# Patient Record
Sex: FEMALE | Race: BLACK OR AFRICAN AMERICAN | Employment: FULL TIME | URBAN - METROPOLITAN AREA
[De-identification: names, ages, dates, MRNs, and addresses within clinical notes are randomized per-mention and may not be internally consistent; named-entity substitution may affect disease eponyms.]

---

## 2018-09-24 ENCOUNTER — HOSPITAL ENCOUNTER (EMERGENCY)
Age: 28
Discharge: HOME | End: 2018-09-24
Attending: EMERGENCY MEDICINE
Payer: COMMERCIAL

## 2018-09-24 VITALS
SYSTOLIC BLOOD PRESSURE: 113 MMHG | OXYGEN SATURATION: 100 % | RESPIRATION RATE: 20 BRPM | DIASTOLIC BLOOD PRESSURE: 77 MMHG | HEART RATE: 70 BPM | TEMPERATURE: 98.5 F

## 2018-09-24 DIAGNOSIS — J02.9 VIRAL PHARYNGITIS: Primary | ICD-10-CM

## 2018-09-24 LAB — S PYO AG THROAT QL: NEGATIVE

## 2018-09-24 PROCEDURE — 96372 THER/PROPH/DIAG INJ SC/IM: CPT

## 2018-09-24 PROCEDURE — 87880 STREP A ASSAY W/OPTIC: CPT

## 2018-09-24 PROCEDURE — 99283 EMERGENCY DEPT VISIT LOW MDM: CPT

## 2018-09-24 PROCEDURE — 87081 CULTURE SCREEN ONLY: CPT

## 2018-09-24 PROCEDURE — 6360000002 HC RX W HCPCS: Performed by: EMERGENCY MEDICINE

## 2018-09-24 RX ORDER — DEXAMETHASONE SODIUM PHOSPHATE 4 MG/ML
10 INJECTION, SOLUTION INTRA-ARTICULAR; INTRALESIONAL; INTRAMUSCULAR; INTRAVENOUS; SOFT TISSUE ONCE
Status: COMPLETED | OUTPATIENT
Start: 2018-09-24 | End: 2018-09-24

## 2018-09-24 RX ADMIN — DEXAMETHASONE SODIUM PHOSPHATE 10 MG: 4 INJECTION, SOLUTION INTRAMUSCULAR; INTRAVENOUS at 22:54

## 2018-09-24 ASSESSMENT — PAIN SCALES - GENERAL: PAINLEVEL_OUTOF10: 7

## 2018-09-25 NOTE — ED NOTES
Patient reports pharyngitis and cough x1 week. Patient reports pain is now lower to right side of neck. Reports pain with chewing and swallowing.       Raysa Johnson RN  09/24/18 0022

## 2018-09-25 NOTE — ED PROVIDER NOTES
and encouragement to follow up with her primary care physician. Patient was treated with below medications:  Medications   dexamethasone (DECADRON) injection 10 mg (10 mg Intramuscular Given 9/24/18 6552)         Clinical Impression     1.  Viral pharyngitis        Disposition     PATIENT REFERRED TO:  Hari Keller [facility]  [phone]      As needed      DISCHARGE MEDICATIONS:  Discharge Medication List as of 9/24/2018 11:12 PM          DISPOSITION Decision To Discharge 09/24/2018 11:16:30 PM         Marianne Myrick MD  Resident  09/25/18 [id number]

## 2018-09-26 LAB — S PYO THROAT QL CULT: NORMAL

## 2018-12-17 ENCOUNTER — HOSPITAL ENCOUNTER (EMERGENCY)
Age: 28
Discharge: HOME | End: 2018-12-17
Attending: EMERGENCY MEDICINE
Payer: COMMERCIAL

## 2018-12-17 VITALS
TEMPERATURE: 98.9 F | BODY MASS INDEX: 33.71 KG/M2 | OXYGEN SATURATION: 100 % | DIASTOLIC BLOOD PRESSURE: 68 MMHG | WEIGHT: 160.6 LBS | SYSTOLIC BLOOD PRESSURE: 129 MMHG | HEART RATE: 81 BPM | HEIGHT: 58 IN | RESPIRATION RATE: 12 BRPM

## 2018-12-17 DIAGNOSIS — J02.9 SORE THROAT: Primary | ICD-10-CM

## 2018-12-17 LAB — S PYO AG THROAT QL: NEGATIVE

## 2018-12-17 PROCEDURE — 87880 STREP A ASSAY W/OPTIC: CPT

## 2018-12-17 PROCEDURE — 87081 CULTURE SCREEN ONLY: CPT

## 2018-12-17 PROCEDURE — 99282 EMERGENCY DEPT VISIT SF MDM: CPT

## 2018-12-17 ASSESSMENT — PAIN SCALES - GENERAL: PAINLEVEL_OUTOF10: 8

## 2018-12-20 LAB — S PYO THROAT QL CULT: NORMAL

## 2019-03-08 ENCOUNTER — HOSPITAL ENCOUNTER (EMERGENCY)
Age: 29
Discharge: HOME | End: 2019-03-08
Attending: EMERGENCY MEDICINE
Payer: COMMERCIAL

## 2019-03-08 VITALS
BODY MASS INDEX: 32.68 KG/M2 | HEART RATE: 101 BPM | OXYGEN SATURATION: 99 % | DIASTOLIC BLOOD PRESSURE: 75 MMHG | WEIGHT: 156.38 LBS | SYSTOLIC BLOOD PRESSURE: 147 MMHG | TEMPERATURE: 98.4 F | RESPIRATION RATE: 14 BRPM

## 2019-03-08 DIAGNOSIS — H10.9 CONJUNCTIVITIS OF LEFT EYE, UNSPECIFIED CONJUNCTIVITIS TYPE: ICD-10-CM

## 2019-03-08 DIAGNOSIS — J02.9 VIRAL PHARYNGITIS: Primary | ICD-10-CM

## 2019-03-08 DIAGNOSIS — R03.0 ELEVATED BLOOD PRESSURE READING: ICD-10-CM

## 2019-03-08 LAB — S PYO AG THROAT QL: NEGATIVE

## 2019-03-08 PROCEDURE — 87880 STREP A ASSAY W/OPTIC: CPT

## 2019-03-08 PROCEDURE — 87081 CULTURE SCREEN ONLY: CPT

## 2019-03-08 PROCEDURE — 99283 EMERGENCY DEPT VISIT LOW MDM: CPT

## 2019-03-08 RX ORDER — POLYMYXIN B SULFATE AND TRIMETHOPRIM 1; 10000 MG/ML; [USP'U]/ML
1 SOLUTION OPHTHALMIC
Qty: 1 BOTTLE | Refills: 0 | Status: SHIPPED | OUTPATIENT
Start: 2019-03-08 | End: 2019-03-15

## 2019-03-08 RX ORDER — IBUPROFEN 600 MG/1
600 TABLET ORAL EVERY 8 HOURS PRN
Qty: 20 TABLET | Refills: 1 | Status: SHIPPED | OUTPATIENT
Start: 2019-03-08 | End: 2019-11-17

## 2019-03-08 ASSESSMENT — PAIN DESCRIPTION - DESCRIPTORS: DESCRIPTORS: ACHING

## 2019-03-08 ASSESSMENT — PAIN SCALES - GENERAL: PAINLEVEL_OUTOF10: 7

## 2019-03-08 ASSESSMENT — PAIN DESCRIPTION - PAIN TYPE: TYPE: ACUTE PAIN

## 2019-03-08 ASSESSMENT — PAIN DESCRIPTION - LOCATION: LOCATION: THROAT

## 2019-03-11 LAB — S PYO THROAT QL CULT: NORMAL

## 2019-08-20 ENCOUNTER — HOSPITAL ENCOUNTER (EMERGENCY)
Age: 29
Discharge: HOME | End: 2019-08-20
Attending: EMERGENCY MEDICINE
Payer: COMMERCIAL

## 2019-08-20 ENCOUNTER — APPOINTMENT (OUTPATIENT)
Dept: GENERAL RADIOLOGY | Age: 29
End: 2019-08-20
Payer: COMMERCIAL

## 2019-08-20 VITALS
DIASTOLIC BLOOD PRESSURE: 47 MMHG | RESPIRATION RATE: 15 BRPM | HEART RATE: 89 BPM | HEIGHT: 58 IN | WEIGHT: 158.4 LBS | TEMPERATURE: 98.4 F | OXYGEN SATURATION: 100 % | BODY MASS INDEX: 33.25 KG/M2 | SYSTOLIC BLOOD PRESSURE: 117 MMHG

## 2019-08-20 DIAGNOSIS — M79.602 LEFT ARM PAIN: Primary | ICD-10-CM

## 2019-08-20 PROCEDURE — 99283 EMERGENCY DEPT VISIT LOW MDM: CPT

## 2019-08-20 PROCEDURE — L3931 WHFO NONTORSION JOINT PREFAB: HCPCS

## 2019-08-20 PROCEDURE — 6370000000 HC RX 637 (ALT 250 FOR IP): Performed by: EMERGENCY MEDICINE

## 2019-08-20 PROCEDURE — 73090 X-RAY EXAM OF FOREARM: CPT

## 2019-08-20 RX ORDER — NAPROXEN 500 MG/1
500 TABLET ORAL 2 TIMES DAILY WITH MEALS
Qty: 30 TABLET | Refills: 0 | Status: SHIPPED | OUTPATIENT
Start: 2019-08-20 | End: 2019-11-17

## 2019-08-20 RX ADMIN — IBUPROFEN 600 MG: 200 TABLET, FILM COATED ORAL at 09:17

## 2019-08-20 ASSESSMENT — PAIN DESCRIPTION - ORIENTATION: ORIENTATION: LEFT

## 2019-08-20 ASSESSMENT — PAIN SCALES - GENERAL
PAINLEVEL_OUTOF10: 5

## 2019-08-20 ASSESSMENT — PAIN DESCRIPTION - PAIN TYPE: TYPE: ACUTE PAIN

## 2019-08-20 ASSESSMENT — PAIN DESCRIPTION - LOCATION: LOCATION: ARM

## 2019-08-20 ASSESSMENT — PAIN DESCRIPTION - DESCRIPTORS: DESCRIPTORS: SHARP

## 2019-08-25 ENCOUNTER — APPOINTMENT (OUTPATIENT)
Dept: GENERAL RADIOLOGY | Age: 29
End: 2019-08-25
Payer: COMMERCIAL

## 2019-08-25 ENCOUNTER — HOSPITAL ENCOUNTER (EMERGENCY)
Age: 29
Discharge: HOME | End: 2019-08-25
Attending: EMERGENCY MEDICINE
Payer: COMMERCIAL

## 2019-08-25 VITALS
SYSTOLIC BLOOD PRESSURE: 130 MMHG | BODY MASS INDEX: 33.17 KG/M2 | HEIGHT: 58 IN | OXYGEN SATURATION: 100 % | TEMPERATURE: 99.1 F | HEART RATE: 91 BPM | RESPIRATION RATE: 18 BRPM | WEIGHT: 158 LBS | DIASTOLIC BLOOD PRESSURE: 84 MMHG

## 2019-08-25 DIAGNOSIS — K21.00 GASTROESOPHAGEAL REFLUX DISEASE WITH ESOPHAGITIS: Primary | ICD-10-CM

## 2019-08-25 PROCEDURE — 93005 ELECTROCARDIOGRAM TRACING: CPT | Performed by: EMERGENCY MEDICINE

## 2019-08-25 PROCEDURE — 71046 X-RAY EXAM CHEST 2 VIEWS: CPT

## 2019-08-25 PROCEDURE — 6370000000 HC RX 637 (ALT 250 FOR IP): Performed by: PHYSICIAN ASSISTANT

## 2019-08-25 PROCEDURE — 99283 EMERGENCY DEPT VISIT LOW MDM: CPT

## 2019-08-25 RX ORDER — FAMOTIDINE 20 MG/1
20 TABLET, FILM COATED ORAL 2 TIMES DAILY
Qty: 14 TABLET | Refills: 0 | Status: SHIPPED | OUTPATIENT
Start: 2019-08-25 | End: 2020-11-21

## 2019-08-25 RX ORDER — PANTOPRAZOLE SODIUM 20 MG/1
40 TABLET, DELAYED RELEASE ORAL DAILY
Qty: 30 TABLET | Refills: 0 | Status: SHIPPED | OUTPATIENT
Start: 2019-08-25

## 2019-08-25 RX ADMIN — LIDOCAINE HYDROCHLORIDE: 20 SOLUTION ORAL; TOPICAL at 20:24

## 2019-08-25 ASSESSMENT — ENCOUNTER SYMPTOMS
NAUSEA: 0
VOMITING: 0
ABDOMINAL PAIN: 0
SORE THROAT: 1
SHORTNESS OF BREATH: 0

## 2019-08-25 ASSESSMENT — PAIN SCALES - GENERAL: PAINLEVEL_OUTOF10: 8

## 2019-08-25 ASSESSMENT — PAIN DESCRIPTION - LOCATION: LOCATION: THROAT

## 2019-08-26 LAB
EKG ATRIAL RATE: 89 BPM
EKG DIAGNOSIS: NORMAL
EKG P AXIS: 47 DEGREES
EKG P-R INTERVAL: 146 MS
EKG Q-T INTERVAL: 354 MS
EKG QRS DURATION: 74 MS
EKG QTC CALCULATION (BAZETT): 430 MS
EKG R AXIS: 69 DEGREES
EKG T AXIS: 37 DEGREES
EKG VENTRICULAR RATE: 89 BPM

## 2019-08-26 NOTE — ED PROVIDER NOTES
ED Attending Attestation Note     Date of evaluation: 8/25/2019    This patient was seen by the advance practice provider. I have seen and examined the patient, agree with the workup, evaluation, management and diagnosis. The care plan has been discussed. My assessment reveals patient with esophageal burning that was improved with a GI cocktail. She is otherwise young and healthy. Times occurred after eating chicken fingers and fries and seem to be worsened with food intake. KG and chest x-ray are unremarkable. I suspect she has esophagitis and will be treated with a PPI given almost complete resolution of her symptoms after the GI cocktail.      Marysol Lorenzo MD  08/26/19 4613

## 2019-11-17 ENCOUNTER — HOSPITAL ENCOUNTER (EMERGENCY)
Age: 29
Discharge: HOME | End: 2019-11-17
Attending: EMERGENCY MEDICINE
Payer: COMMERCIAL

## 2019-11-17 VITALS
TEMPERATURE: 98.7 F | SYSTOLIC BLOOD PRESSURE: 140 MMHG | HEART RATE: 95 BPM | BODY MASS INDEX: 33.86 KG/M2 | HEIGHT: 58 IN | WEIGHT: 161.3 LBS | OXYGEN SATURATION: 100 % | RESPIRATION RATE: 18 BRPM | DIASTOLIC BLOOD PRESSURE: 74 MMHG

## 2019-11-17 DIAGNOSIS — J02.9 ACUTE PHARYNGITIS, UNSPECIFIED ETIOLOGY: Primary | ICD-10-CM

## 2019-11-17 DIAGNOSIS — H10.9 CONJUNCTIVITIS OF RIGHT EYE, UNSPECIFIED CONJUNCTIVITIS TYPE: ICD-10-CM

## 2019-11-17 PROCEDURE — 6370000000 HC RX 637 (ALT 250 FOR IP): Performed by: EMERGENCY MEDICINE

## 2019-11-17 PROCEDURE — 99282 EMERGENCY DEPT VISIT SF MDM: CPT

## 2019-11-17 PROCEDURE — 6360000002 HC RX W HCPCS: Performed by: EMERGENCY MEDICINE

## 2019-11-17 RX ORDER — NAPROXEN 500 MG/1
500 TABLET ORAL ONCE
Status: COMPLETED | OUTPATIENT
Start: 2019-11-17 | End: 2019-11-17

## 2019-11-17 RX ORDER — NAPROXEN 500 MG/1
500 TABLET ORAL 2 TIMES DAILY PRN
Qty: 60 TABLET | Refills: 0 | Status: SHIPPED | OUTPATIENT
Start: 2019-11-17 | End: 2020-01-07

## 2019-11-17 RX ORDER — ERYTHROMYCIN 5 MG/G
OINTMENT OPHTHALMIC
Qty: 1 TUBE | Refills: 0 | Status: SHIPPED | OUTPATIENT
Start: 2019-11-17 | End: 2019-11-27

## 2019-11-17 RX ORDER — DEXAMETHASONE 4 MG/1
8 TABLET ORAL ONCE
Status: COMPLETED | OUTPATIENT
Start: 2019-11-17 | End: 2019-11-17

## 2019-11-17 RX ADMIN — NAPROXEN 500 MG: 500 TABLET ORAL at 20:37

## 2019-11-17 RX ADMIN — DEXAMETHASONE 4 MG: 4 TABLET ORAL at 20:36

## 2019-11-17 ASSESSMENT — PAIN SCALES - GENERAL
PAINLEVEL_OUTOF10: 9
PAINLEVEL_OUTOF10: 7

## 2019-11-17 ASSESSMENT — PAIN DESCRIPTION - FREQUENCY: FREQUENCY: INTERMITTENT

## 2019-11-17 ASSESSMENT — PAIN DESCRIPTION - LOCATION: LOCATION: THROAT

## 2019-11-17 ASSESSMENT — PAIN DESCRIPTION - DESCRIPTORS: DESCRIPTORS: ACHING

## 2019-11-17 ASSESSMENT — PAIN DESCRIPTION - PAIN TYPE: TYPE: ACUTE PAIN

## 2020-01-07 ENCOUNTER — HOSPITAL ENCOUNTER (EMERGENCY)
Age: 30
Discharge: HOME | End: 2020-01-07
Attending: EMERGENCY MEDICINE
Payer: COMMERCIAL

## 2020-01-07 VITALS
WEIGHT: 158.7 LBS | TEMPERATURE: 100.9 F | HEART RATE: 105 BPM | SYSTOLIC BLOOD PRESSURE: 121 MMHG | DIASTOLIC BLOOD PRESSURE: 63 MMHG | RESPIRATION RATE: 16 BRPM | BODY MASS INDEX: 33.17 KG/M2 | OXYGEN SATURATION: 99 %

## 2020-01-07 LAB
RAPID INFLUENZA  B AGN: POSITIVE
RAPID INFLUENZA A AGN: NEGATIVE

## 2020-01-07 PROCEDURE — 99283 EMERGENCY DEPT VISIT LOW MDM: CPT

## 2020-01-07 PROCEDURE — 87804 INFLUENZA ASSAY W/OPTIC: CPT

## 2020-01-07 RX ORDER — OSELTAMIVIR PHOSPHATE 75 MG/1
75 CAPSULE ORAL 2 TIMES DAILY
Qty: 10 CAPSULE | Refills: 0 | Status: SHIPPED | OUTPATIENT
Start: 2020-01-07 | End: 2020-01-12

## 2020-01-07 RX ORDER — IBUPROFEN 600 MG/1
600 TABLET ORAL EVERY 8 HOURS PRN
Qty: 20 TABLET | Refills: 1 | Status: SHIPPED | OUTPATIENT
Start: 2020-01-07 | End: 2020-11-21

## 2020-01-07 RX ORDER — DEXTROMETHORPHAN POLISTIREX 30 MG/5ML
60 SUSPENSION ORAL 2 TIMES DAILY PRN
Qty: 89 ML | Refills: 1 | Status: SHIPPED | OUTPATIENT
Start: 2020-01-07 | End: 2020-01-14

## 2020-01-07 ASSESSMENT — PAIN DESCRIPTION - PAIN TYPE: TYPE: ACUTE PAIN

## 2020-01-07 ASSESSMENT — PAIN DESCRIPTION - LOCATION: LOCATION: CHEST

## 2020-01-07 ASSESSMENT — PAIN SCALES - GENERAL: PAINLEVEL_OUTOF10: 8

## 2020-01-07 NOTE — ED PROVIDER NOTES
TRIAGE CHIEF COMPLAINT:   Chief Complaint   Patient presents with    Chest Pain         HPI: Elizabeth Muniz is a 34 y.o. female who presents to the Emergency Department with complaint of 3-day history of myalgias, dry cough and fever. She states she has some bilateral chest discomfort only when she coughs. Denies shortness of breath. No chest pain with walking. Denies sore throat. She has some mild bilateral ear pain. Denies abdominal pain vomiting or bowel complaint. No UTI symptoms. 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 REVIEW OF SYSTEMS:  6 systems reviewed. Pertinent positives per HPI. Otherwise noted to be negative. Nursing notes reviewed and agree with above. Past medical/surgical history reviewed. MEDICATIONS   Patient's Medications   New Prescriptions    No medications on file   Previous Medications    FAMOTIDINE (PEPCID) 20 MG TABLET    Take 1 tablet by mouth 2 times daily    NAPROXEN (NAPROSYN) 500 MG TABLET    Take 1 tablet by mouth 2 times daily as needed for Pain    NONFORMULARY    bcp    PANTOPRAZOLE (PROTONIX) 20 MG TABLET    Take 2 tablets by mouth daily   Modified Medications    No medications on file   Discontinued Medications    No medications on file         ALLERGIES No Known Allergies      /63   Pulse 105   Temp 100.9 °F (38.3 °C) (Oral)   Resp 16   Wt 72 kg (158 lb 11.2 oz)   LMP 12/31/2019   SpO2 99%   BMI 33.17 kg/m²   General:  No acute distress. Non toxic appearance  Head:   Normocephalic and atraumatic  Eyes:   Conjunctiva clear, ANDREW, EOM's intact. Sclera anicteric. ENT:   Mucous membranes moist.  TMs are both normal.  Nose is clear. No sinus tenderness. Oropharynx shows no redness, swelling or exudate. Neck:   Supple. No adenopathy or jugular venous distension  Lungs/Chest:  No respiratory distress. Lungs are clear bilaterally. She is not coughing here.   CVS:   Regular rate and

## 2020-11-21 ENCOUNTER — HOSPITAL ENCOUNTER (EMERGENCY)
Age: 30
Discharge: HOME | End: 2020-11-21
Attending: EMERGENCY MEDICINE
Payer: COMMERCIAL

## 2020-11-21 ENCOUNTER — APPOINTMENT (OUTPATIENT)
Dept: GENERAL RADIOLOGY | Age: 30
End: 2020-11-21
Payer: COMMERCIAL

## 2020-11-21 VITALS
HEIGHT: 59 IN | RESPIRATION RATE: 20 BRPM | WEIGHT: 164.9 LBS | TEMPERATURE: 98.6 F | BODY MASS INDEX: 33.24 KG/M2 | DIASTOLIC BLOOD PRESSURE: 55 MMHG | HEART RATE: 82 BPM | SYSTOLIC BLOOD PRESSURE: 127 MMHG | OXYGEN SATURATION: 100 %

## 2020-11-21 PROCEDURE — 73140 X-RAY EXAM OF FINGER(S): CPT

## 2020-11-21 PROCEDURE — 99283 EMERGENCY DEPT VISIT LOW MDM: CPT

## 2020-11-21 PROCEDURE — 6370000000 HC RX 637 (ALT 250 FOR IP): Performed by: EMERGENCY MEDICINE

## 2020-11-21 RX ORDER — ACETAMINOPHEN 500 MG
1000 TABLET ORAL ONCE
Status: COMPLETED | OUTPATIENT
Start: 2020-11-21 | End: 2020-11-21

## 2020-11-21 RX ADMIN — ACETAMINOPHEN 1000 MG: 500 TABLET ORAL at 08:46

## 2020-11-21 ASSESSMENT — PAIN DESCRIPTION - LOCATION: LOCATION: HAND

## 2020-11-21 ASSESSMENT — PAIN DESCRIPTION - DESCRIPTORS: DESCRIPTORS: NUMBNESS

## 2020-11-21 ASSESSMENT — PAIN DESCRIPTION - ORIENTATION: ORIENTATION: LEFT

## 2020-11-21 ASSESSMENT — PAIN DESCRIPTION - PAIN TYPE: TYPE: ACUTE PAIN

## 2020-11-21 ASSESSMENT — PAIN SCALES - GENERAL: PAINLEVEL_OUTOF10: 0

## 2020-11-21 NOTE — ED PROVIDER NOTES
CHIEF COMPLAINT  Hand Pain (LMF pain after hitting a bed with finger yesterday, per pt. + swelling, - lac, decr ROM. No other known injuries.)      HISTORY OF PRESENT ILLNESS  Madhu Chavez is a 27 y.o. female, who presents to the ED with onset yesterday of left middle finger pain after she accidentally hit her left middle finger on a headboard of a bed at her place of work. Patient has mild pain with rest severe pain with movement of the finger. She states that she feels a \"numbness\" when she does not move her finger. She denies other injuries. She has not taken oral analgesics for the pain. No other complaints,modifying factors or associated symptoms. Review of Systems    I have reviewed the following from the nursing documentation. Past Medical History:   Diagnosis Date    Influenza B 2020    Neisseria gonorrheae 2018     Past Surgical History:   Procedure Laterality Date     SECTION       SECTION       History reviewed. No pertinent family history.   Social History     Socioeconomic History    Marital status: Single     Spouse name: Not on file    Number of children: Not on file    Years of education: Not on file    Highest education level: Not on file   Occupational History    Not on file   Social Needs    Financial resource strain: Not on file    Food insecurity     Worry: Not on file     Inability: Not on file    Transportation needs     Medical: Not on file     Non-medical: Not on file   Tobacco Use    Smoking status: Never Smoker    Smokeless tobacco: Never Used   Substance and Sexual Activity    Alcohol use: No    Drug use: No    Sexual activity: Yes     Partners: Male   Lifestyle    Physical activity     Days per week: Not on file     Minutes per session: Not on file    Stress: Not on file   Relationships    Social connections     Talks on phone: Not on file     Gets together: Not on file     Attends [employer] service: Not on file     Active member middle phalanx. PROCEDURES  Procedures    ED COURSE/MDM  Patient seen and evaluated. Old records reviewed if pertinent. Labs and imaging reviewed and results discussed withpatient. I considered finger fracture, dislocation, soft tissue injury including strain, sprain, contusion. Plan of care discussed with patient or family as appropriate. Patient or family in agreement with plan. The patient was advised to keep the splint on until seen by Dr. Senait Schneider next week. She was also advised that Dr. Senait Schneider would evaluate for the need for further treatment including possibility of wire placement for fixation. If discharged, patient was given scripts for the following medications. New Prescriptions    No medications on file       CLINICAL IMPRESSION  1. Closed nondisplaced fracture of middle phalanx of left middle finger, initial encounter        Blood pressure (!) 127/55, pulse 82, temperature 98.6 °F (37 °C), temperature source Oral, resp. rate 20, height 4' 11\" (1.499 m), weight 164 lb 14.4 oz (74.8 kg), last menstrual period 12/31/2019, SpO2 100 %, not currently breastfeeding. DISPOSITION  Waylon Ramirez was discharged in stable condition.                      Eboni Perez MD  11/21/20 5714

## 2020-11-30 ENCOUNTER — OFFICE VISIT (OUTPATIENT)
Dept: ORTHOPEDIC SURGERY | Age: 30
End: 2020-11-30
Payer: COMMERCIAL

## 2020-11-30 VITALS — HEIGHT: 59 IN | BODY MASS INDEX: 33.06 KG/M2 | WEIGHT: 164 LBS

## 2020-11-30 PROCEDURE — 99203 OFFICE O/P NEW LOW 30 MIN: CPT | Performed by: ORTHOPAEDIC SURGERY

## 2020-11-30 PROCEDURE — G8419 CALC BMI OUT NRM PARAM NOF/U: HCPCS | Performed by: ORTHOPAEDIC SURGERY

## 2020-11-30 PROCEDURE — G8484 FLU IMMUNIZE NO ADMIN: HCPCS | Performed by: ORTHOPAEDIC SURGERY

## 2020-11-30 PROCEDURE — G8427 DOCREV CUR MEDS BY ELIG CLIN: HCPCS | Performed by: ORTHOPAEDIC SURGERY

## 2020-11-30 PROCEDURE — 1036F TOBACCO NON-USER: CPT | Performed by: ORTHOPAEDIC SURGERY

## 2020-11-30 NOTE — PROGRESS NOTES
Chief Complaint  Finger Pain (Left long finger)      History of Present Illness:  Dipak Yu is a 27 y.o. 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  Date of injury: 11/20/2020  Mechanism of injury: The patient was in a residence room when she struck her left hand off of the top of the bed particularly her left long finger  She presented several days later to the emergency department where images obtained demonstrated fracture at the base of middle phalanx volar aspect nondisplaced involving approximately 25% of joint surface  The patient was placed in a splint at the time of her injury  She reports that she is continue to have some discomfort when her finger is bumped and she has been in a finger splint since that time reporting some tingling sensation as well  No antecedent injury or associated injuries reported by the patient. The patient does report that she is 2 months pregnant currently    Medical History  Patient's medications, allergies, past medical, surgical, social and family histories were reviewed and updated as appropriate. Past Medical History:   Diagnosis Date    Influenza B 01/07/2020    Neisseria gonorrheae 06/28/2018       Review of Systems  Pertinent items are noted in HPI  Review of systems reviewed from Patient History Form dated on 11/30/2020 and available in the patient's chart under the Media tab. Vital Signs  There were no vitals filed for this visit. General Exam:   Constitutional: Patient is adequately groomed with no evidence of malnutrition  Mental Status: The patient is oriented to time, place and person. The patient's mood and affect are appropriate. Lymphatic: The lymphatic examination bilaterally reveals all areas to be without enlargement or induration. Neurological: The patient has good coordination. There is no weakness or sensory deficit.     Left long finger/hand examination  Inspection: Mild swelling of the left long finger  No evidence of clinical malrotation or angulation  No open wounds or skin breaks  No evidence of boutonniere or swan-neck deformity    Palpation: Mild tenderness to palpation near the PIP joint specifically volar aspect  No tenderness throughout the remainder of the finger or hand  Grossly stable finger with gentle stress radial and ulnar at PIP joint at full extension and 30 degrees of flexion  Range of Motion: Limited range of motion particularly with PIP joint flexion today approximately 30 degrees actively, full active extension of PIP joint is noted    Strength: 5 out of 5 FDS FDP EDC interossei    Special Tests: Negative Uriel's test left long finger  Gross sensation throughout fingertip radial and ulnar aspect of the fingertip with brisk capillary refill    Skin: There are no additional worrisome rashes, ulcerations or lesions. Gait: normal nonantalgic gait    Circulation: well perfused with brisk capillary refill        Additional Comments:     Additional Examinations:  Right Upper Extremity:  Examination of the right upper extremity does not show any tenderness, deformity or injury. Range of motion is unremarkable. There is no gross instability. There are no rashes, ulcerations or lesions. Strength and tone are normal.      Radiology:     X-rays obtained and reviewed in office:  Views 1  Location left long finger  Impression 1 view obtained secondary to patient pregnancy, lateral view demonstrating nondisplaced fracture at the base of the middle phalanx what appears to be involving 20 to 25% of the joint with no evidence of intra-articular step-off or gap, negative V sign with no joint subluxation      Assessment: 26-year-old female presenting with history of left long finger injury with direct blow to the left long finger sustained 11/20/2020  1.   Nondisplaced left long finger middle phalanx base intra-articular fracture with no evidence of joint subluxation or dislocation    Impression:   Encounter Diagnosis   Name Primary?  Closed nondisplaced fracture of middle phalanx of left middle finger, initial encounter Yes       Office Procedures:  Orders Placed This Encounter   Procedures    XR FINGER LEFT (MIN 2 VIEWS)     Standing Status:   Future     Number of Occurrences:   1     Standing Expiration Date:   11/30/2021       Treatment Plan: Discussed today the patient's injury and the risks and benefits of obtaining additional imaging particularly to evaluate for presence of possible subluxation or instability at the joint. The patient was understanding due to her recent pregnancy and we did just obtain 1 lateral image to evaluate today  The joint demonstrates continued stable joint surface with no intra-articular step-off or gap and no evidence of joint subluxation. Based on appearance of the joint I do think that we can continue with conservative management for now. We will provide the patient with staci straps to be worn at all times except for hand hygiene. Appropriate skin care discussed with use of staci straps. I think it is appropriate for the patient to begin some gentle active range of motion for her finger as she does demonstrate stiffness now 10 days status post injury. We will refer her to hand therapy to start that likely next week. I would like her to avoid any heavy lifting or gripping with that hand with lifting gripping restrictions 5 pounds only using her fingers for some very light activities.   We will continue to closely monitor and see her back in the next 2 weeks for repeat evaluation clinically and discuss the possibility of repeat imaging if necessary

## 2020-12-01 ENCOUNTER — HOSPITAL ENCOUNTER (OUTPATIENT)
Dept: OCCUPATIONAL THERAPY | Age: 30
Setting detail: THERAPIES SERIES
Discharge: HOME | End: 2020-12-01
Payer: COMMERCIAL

## 2020-12-01 PROCEDURE — 97165 OT EVAL LOW COMPLEX 30 MIN: CPT | Performed by: OCCUPATIONAL THERAPIST

## 2020-12-01 PROCEDURE — 97110 THERAPEUTIC EXERCISES: CPT | Performed by: OCCUPATIONAL THERAPIST

## 2020-12-01 NOTE — PLAN OF CARE
[facility]  [address], [address] [address], [address] [facility]  Phone: [phone] Fax: [phone]            Occupational Therapy/Hand Therapy Certification  Dear Referring Practitioner: Dr. Jd King,     We had the pleasure of evaluating the following patient for occupational therapy services at [address] [place] [address]. A summary of our findings can be found in the initial assessment below. This includes our plan of care. If you have any questions or concerns regarding these findings, please do not hesitate to contact me at the office phone number checked above. Thank you for the referral.     Physician Signature:_______________________________Date:__________________  By signing above (or electronic signature), therapists plan is approved by physician      Patient: Humera Alvarado   : 1990   MRN: [id number]  Referring Physician: Referring Practitioner: Dr. Jd King      Evaluation Date: 2020      Medical Diagnosis Information:  Diagnosis: L77.140Q (ICD-10-CM) - Closed nondisplaced fracture of middle phalanx of left middle finger, initial encounter    Treatment Diagnosis: M25.642              Insurance information: OT Insurance Information: [employer]      Date of Injury: 20  Date of Surgery: N/A    Date of Patient follow up with Physician: 20    RESTRICTIONS/PRECAUTIONS: 5# lifting restriction at work    Latex Allergy:  []No      []Yes  Pacemaker:  [] No       [] Yes     Preferred Language for Healthcare:   [x]English       []other:     Functional Scale: 25% (Quick DASH)   Date assessed:  2020    C-SSRS Triggered by Intake questionnaire (Past 2 wk assessment):    No, Questionnaire did not trigger screening.     Yes, Patient intake triggered further evaluation       C-SSRS Screening completed   PCP notified via Plan of Care    Emergency services notified    SUBJECTIVE: Patient reported deficits/history of current problem: referred for therapy due to ongoing stiffness following fracture at work 2 weeks ago. Pain Scale: 7/10  [x]Constant      []Intermittent    []other:  Pain Location:  Middle finger  Easing factors: none  Provocative factors: no worsening     [x] Patient reported history, allergies, and medications reviewed - see intake form. Occupational Profile:  Home Enviroment: lives with  [] spouse,  [] family,  [] alone,  [] significant other,   [] other:    Occupation/School: [employer].  Duties are very limited due to restrictions    Recreational Activities/Meaningful Interests: job is very physical, walking    Prior Level of Function: [x] Independent with ADLs/IADLs     [] Assistance needed (describe):    Patient-Identified Primary Performance Deficits (to be addressed in POC):   [] bathing    [] household tasks    [] dressing    [] self feeding   [] grooming    [] work/education   [] functional mobility   [] sleeping/rest   [] toileting/hygiene   [] recreational activities   [] driving    [] community/social participation   [] other:     Comorbidities Affecting Functional Performance:     []Anxiety (F41.9)/Depression (F32.9)   []Diabetes Type 1(E10.65) or 2 (E11.65)   []Rheumatoid Arthritis (M05.9)  []Fibromyalgia (M79.7)  []Neuropathy(G60.9)  []Osteoarthritis(M19.91)  []None   []Other:    Hand Dominance:    [x]  Right    [] Left      OBJECTIVE:        AROM: Right Left   IF MP  PIP  DIP       LF MP  PIP  DIP    0/80  0/25  0/0   RF MP  PIP   DIP       SF MP  PIP  DIP       Digits: tips to DPFC           Thumb MP  IP     Thumb tip to DPFC     Wrist Ext/Flex            RD/UD     Forearm sup/pron       Elbow ext/flex       Shoulder Flex                   Abd                   IR/ER          Edema:     Middle PIPJ 5.9cm 6.2cm        Strength:  NT    II     Lateral Pinch     3 Point Pinch     Tip Pinch     MMT:            Observations (including splints, bandages, incisions, scars): N/A    Sensation:  [x] No reported deficits  [] Intact to light touch    [] [place] test completed, findings as noted:  [] Other:    Palpation: N/A    Functional Mobility/Transfers/Gait:  [x] Independent - no significant gait deviations  [] Assistance needed   [] Assistive device used: Falls Risk Assessment (30 days):   [x] Falls Risk assessed and no intervention required. [] Falls Risk assessed and Patient requires intervention due to being higher risk   TUG score (>12s at risk):     [] Falls education provided, including      Review Of Systems (ROS): [x]Performed Review of systems (Integumentary, CardioPulmonary, Neurological) by intake and observation. Intake form has been scanned into medical record. Patient has been instructed to contact their primary care physician regarding ROS issues if not already being addressed at this time. ASSESSMENT:   This patient presents with signs and symptoms consistent with the medical diagnosis provided by the referring physician. Impairments (physical, cognitive and/or psychosocial):  [x] Decreased mobility  [] Weakness    [] Hypersensitivity   [x] Pain/tenderness   [x] Edema/swelling   [] Decreased coordination (fine/gross motor)   [] Impaired body mechanics  [] Sensory loss  [] Loss of balance   [] Other:      Performance Deficits (to be addressed in plan of care):   [] Bathing    [x] Household Tasks    [] Dressing    [] Self Feeding   [] Grooming    [x] Work/Education   [x] Functional Mobility  [] Sleeping/Rest   [] Toileting/Hygiene   [] Recreational Activities   [] Driving    [] Community/Social Participation   [] Other:     Rehab Potential:   [] Excellent [x] Good [] Fair  [] Poor     Barriers affecting rehab potential:  []Age    []Lack of Motivation   []Co-Morbidities  []Cognitive Function  []Environmental/home/work barriers  []Other:     Tolerance of evaluation/treatment:    [] Excellent [x] Good [] Fair  [] Poor    PLAN OF CARE:  Interventions: [x] Therapeutic Exercise [x] Therapeutic Activity    [x] Activities of Daily Living [x] Neuromuscular Re-education      [x] Patient Education  [x] Manual Therapy      [x] Modalities as needed, and not otherwise contraindicated, including: ultrasound,paraffin,moist heat/cold pack, electrical stimulation, contrast bath, iontophoresis  [] Buddy straps    Frequency/Duration:  1-2 days per week for 4-6 weeks      GOALS:  Patient stated goal: moving my fingers better    [] Progressing: [] Met: [] Not Met: [] Adjusted    Therapist goals for Patient:   Short Term Goals: To be achieved in: 2 weeks  1. Independent in HEP and progression per patient tolerance, in order to prevent re-injury. [] Progressing: [] Met: [] Not Met: [] Adjusted   2. Patient will have a decrease in pain to facilitate improvement in movement, function, and ADLs as indicated by Functional Deficits. [] Progressing: [] Met: [] Not Met: [] Adjusted    Long Term Goals to be achieved in 6 weeks (through 1/15/21), including patient directed goals to address patient identified performance deficits:  1) Pt to be independent in graded HEP progression with a good level of effort and compliance. [] Progressing: [] Met: [] Not Met: [] Adjusted   2) Pt to report a score of </= 15 % on the Quick DASH disability questionnaire for increased performance with carrying, moving, and handling objects. [] Progressing: [] Met: [] Not Met: [] Adjusted   3) Pt will demonstrate increased middle finger ROM by 50 degrees for improved independence with gross grasp, work tasks, wheelchair pushing. [] Progressing: [] Met: [] Not Met: [] Adjusted   4) Pt will demonstrate increased strength to 50% of uninvolved hand for improved independence with heavier workplace lifting, household tasks. [] Progressing: [] Met: [] Not Met: [] Adjusted   5) Pt will have a decrease in pain to 4/10 to facilitate return to normal functional use patterns during day.   [] Progressing: [] Met: [] Not Met: [] Adjusted        OCCUPATIONAL THERAPY EVALUATION COMPLEXITY JUSTIFICATION:    [x] An occupational profile and medical/therapy history, which includes:   [x] a brief history including medical and/or therapy records relating to the     presenting problem   [] an expanded review of medical and/or therapy records and additional review     of physical, cognitive or psychosocial history related to current functional    performance   [] an extensive additional review of review of medical and/or therapy records and physical, cognitive, or psychosocial history related to current    functional performance    [x] An assessment that identifies performance deficits (relating to physical, cognitive, or psychosocial skills) that result in activity limitations and/or participation restrictions:   [x] 1-3 performance deficits   [] 3-5 performance deficits   [] 5 or more performance deficits    [x] Clinical decision making of:   [x] low complexity, including analysis of occupational profile, data analysis from problem focused assessment, and consideration of a limited number of treatment options. No comorbidities affect occupational performance. No task modifications or assistance needed to complete evaluation. [] moderate complexity, including analysis of occupational profile, data analysis from detailed assessment and consideration of several treatment options. Comorbidities that affect occupational performance may be present. Minimal to moderate task modifications or assistance needed to complete assessment. [] high complexity, including analysis of occupational profile, analysis of data from comprehensive assessment and consideration of multiple treatment options. Multiple comorbidities present that affect occupational performance. Significant task modifications or assistance needed to complete assessment.     Evaluation Code:  [x] Low Complexity HEATHER 44274 (typically 30 minutes face to face)  [] Mod Complexity EVAL 45746 (typically 45 minutes face to face)  [] High Complexity EVAL 04523 (typically 60 minutes face to face)    Electronically signed by:  Kimi Tracy OTR/L, [facility]

## 2020-12-01 NOTE — FLOWSHEET NOTE
INTERVENTIONS:      Therapeutic Exercise (58993)                              Therapeutic Activity (55090)                              Manual Therapy (30853)                  Neuromuscular Reeducation (35773)                  ADL Training (63734)                  HEP Training/Review PIP/DIPJ ROM with MPJ blocking; staci strap wear for all activities                 Splinting      Lcode:      Orthotic Mgmt, Subsequent Enc (10916)      Orthotic Mgmt & Training (38412)            Other: Issued spandage                               Therapeutic Exercise & NMR:  [] (18041) Provided verbal/tactile cueing for activities related to strengthening, flexibility, endurance, ROM  for improvements in scapular, scapulothoracic and UE control with self care, reaching, carrying, lifting, house/yardwork, driving/computer work.    [] (65652) Provided verbal/tactile cueing for activities related to improving balance, coordination, kinesthetic sense, posture, motor skill, proprioception  to assist with  scapular, scapulothoracic and UE control with self care, reaching, carrying, lifting, house/yardwork, driving/computer work.     Therapeutic Activities & NMR:    [] (28236 or 89203) Provided verbal/tactile cueing for activities related to improving balance, coordination, kinesthetic sense, posture, motor skill, proprioception and motor activation to allow for proper function of scapular, scapulothoracic and UE control with self care, carrying, lifting, driving/computer work    Home Exercise Program:    [] (47588) Reviewed/Progressed HEP activities related to strengthening, flexibility, endurance, ROM of scapular, scapulothoracic and UE control with self care, reaching, carrying, lifting, house/yardwork, driving/computer work  [] (56417) Reviewed/Progressed HEP activities related to improving balance, coordination, kinesthetic sense, posture, motor skill, proprioception of scapular, scapulothoracic and UE control with self care, reaching, carrying, lifting, house/yardwork, driving/computer work      Manual Treatments:  PROM / STM / Oscillations-Mobs:  G-I, II, III, IV (PA's, Inf., Post.)  [] (04176) Provided manual therapy to mobilize soft tissue/joints of cervical/CT, scapular GHJ and UE for the purpose of modulating pain, promoting relaxation,  increasing ROM, reducing/eliminating soft tissue swelling/inflammation/restriction, improving soft tissue extensibility and allowing for proper ROM for normal function with self care, reaching, carrying, lifting, house/yardwork, driving/computer work    ADL Training:  [] (28076) Provided self-care/home management training related to activities of daily living and compensatory training, and/or use of adaptive equipment      Charges:  Timed Code Treatment Minutes: 25   Total Treatment Minutes: 45   Worker's Comp: Time In/Time Out     [x] EVAL (LOW) 74639 (typically 20 minutes face-to-face)    [] EVAL (MOD) 05702 (typically 30 minutes face-to-face)  [] EVAL (HIGH) 86354 (typically 45 minutes face-to-face)  [] OT Re-eval (28319)       [x] Kaylee [phone] x 2     [] DDQWG(60497)  [] NMR (80837) x      [] Estim (attended) (65579)   [] Manual ([phone]) x      [] US (53280)  [] TA (18486) x      [] Paraffin (11012)  [] ADL  [phone]) x     [] Splint/L code:    [] Estim (unattended) [phone]  [] Fluidotherapy (93484)  [] Other:      ASSESSMENT:  See eval    GOALS: Patient stated goal: moving my fingers better    []? Progressing: []? Met: []? Not Met: []? Adjusted     Therapist goals for Patient:   Short Term Goals: To be achieved in: 2 weeks  1. Independent in HEP and progression per patient tolerance, in order to prevent re-injury. []? Progressing: []? Met: []? Not Met: []? Adjusted   2. Patient will have a decrease in pain to facilitate improvement in movement, function, and ADLs as indicated by Functional Deficits. []? Progressing: []? Met: []? Not Met: []?  Adjusted     Long Term Goals to be achieved in 6 weeks (through 1/15/21), including patient directed goals to address patient identified performance deficits:  1) Pt to be independent in graded HEP progression with a good level of effort and compliance. []? Progressing: []? Met: []? Not Met: []? Adjusted   2) Pt to report a score of </= 15 % on the Quick DASH disability questionnaire for increased performance with carrying, moving, and handling objects. []? Progressing: []? Met: []? Not Met: []? Adjusted   3) Pt will demonstrate increased middle finger ROM by 50 degrees for improved independence with gross grasp, work tasks, wheelchair pushing. []? Progressing: []? Met: []? Not Met: []? Adjusted   4) Pt will demonstrate increased strength to 50% of uninvolved hand for improved independence with heavier workplace lifting, household tasks. []? Progressing: []? Met: []? Not Met: []? Adjusted   5) Pt will have a decrease in pain to 4/10 to facilitate return to normal functional use patterns during day. []? Progressing: []? Met: []? Not Met: []? Adjusted      Overall Progression Towards Functional Goals/Treatment Progress Update:  [] Patient is progressing as expected towards functional goals listed. [] Progression is slowed due to complexities/impairments listed. [] Progression has been slowed due to co-morbidities.   [x] Plan just implemented, too soon to assess goals progression <30 days  [] Goals require adjustment due to lack of progress  [] Patient is not progressing as expected and requires additional follow up with physician  [] All goals are met  [] Other:     Prognosis for POC: [x] Good [] Fair  [] Poor    Patient requires continued skilled intervention: [x] Yes  [] No    Treatment/Activity Tolerance:  [x] Patient able to complete treatment  [] Patient limited by fatigue  [] Patient limited by pain    [] Patient limited by other medical complications  [] Other:                  PLAN: See eval  [] Continue per plan of care [] Alter current plan (see comments above)  [x] Plan of care initiated [] Hold pending MD visit [] Discharge      Electronically signed by:  Cas Hassan OTR/ROWENA, CHT [id number]      Note: If patient does not return for scheduled/ recommended follow up visits, this note will serve as a discharge from care along with most recent update on progress.

## 2020-12-10 ENCOUNTER — HOSPITAL ENCOUNTER (OUTPATIENT)
Dept: OCCUPATIONAL THERAPY | Age: 30
Setting detail: THERAPIES SERIES
Discharge: HOME | End: 2020-12-10
Payer: COMMERCIAL

## 2020-12-10 PROCEDURE — 97035 APP MDLTY 1+ULTRASOUND EA 15: CPT | Performed by: OCCUPATIONAL THERAPIST

## 2020-12-10 PROCEDURE — 97110 THERAPEUTIC EXERCISES: CPT | Performed by: OCCUPATIONAL THERAPIST

## 2020-12-10 NOTE — FLOWSHEET NOTE
[address] [facility], [facility]  [address], [address] [facility], [address] [facility]  Phone: [phone] Fax: [phone]        Occupational Therapy Treatment Note/ Progress Report:     Date:  12/10/2020    Patient Name:  Zara Verma    :  1990  MRN: [id number]    Medical/Treatment Diagnosis Information:  · Diagnosis: V84.221M (ICD-10-CM) - Closed nondisplaced fracture of middle phalanx of left middle finger, initial encounter   · Treatment Diagnosis: I77.425     Insurance/Certification information:  OT Insurance Information: [facility]  Physician Information:  Referring Practitioner: Dr. Irina Stockton  Has the plan of care been signed (Y/N):        []  Yes  [x]  No       Visit # Insurance Allowable Auth Required   2 30 []  Yes [x]  No        Is this a Progress Report:     []  Yes  [x]  No      If Yes:  Date Range for reporting period:  Beginning  Ending    Progress report will be due (10 Rx or 30 days whichever is less):      Recertification will be due (POC Duration  / 90 days whichever is less): 3/1/21     Date of Injury: 20  Date of Surgery: N/A    Date of Patient follow up with Physician: 20    RESTRICTIONS/PRECAUTIONS: 5# lifting restriction at work    Latex Allergy:  []No      []Yes  Pacemaker:  [x] No       [] Yes     Preferred Language for Healthcare:   [x]English       []other:     Functional Scale: 25% (Quick DASH)   Date assessed:  12/10/2020    SUBJECTIVE: Finger is much better. Using staci strap during day. No spandage use now      Pain Scale: 7/10    OBJECTIVE:       Date:   12/1/20 12/10/20    Objective Measures/Tests:      ROM:      L middle finger AROM 0/80  0/25  0/0 0/80  0/97  0/67    PIPJ edema R5.9cm L6.2cm L6.0cm          Strength: NT           Observations:        Other:                  MODALITIES:      Fluidotherapy (36454)      Estim ()      Paraffin (17079)      US (18417)  8' volar PIPJ    Iontophoresis (48434) Hot Pack 10'     Cold Pack            INTERVENTIONS:      Therapeutic Exercise (30132)      Foam block gross grasp  8'    Putty stabs  Yellow putty                Therapeutic Activity (24955)                              Manual Therapy (36101)                  Neuromuscular Reeducation (46794)                  ADL Training (33923)                  HEP Training/Review PIP/DIPJ ROM with MPJ blocking; buddy strap wear for all activities Continue HEP as needed                Splinting      Lcode:      Orthotic Mgmt, Subsequent Enc (89166)      Orthotic Mgmt & Training (71794)            Other: Issued spandage                               Therapeutic Exercise & NMR:  [x] (19183) Provided verbal/tactile cueing for activities related to strengthening, flexibility, endurance, ROM  for improvements in scapular, scapulothoracic and UE control with self care, reaching, carrying, lifting, house/yardwork, driving/computer work.    [] (10458) Provided verbal/tactile cueing for activities related to improving balance, coordination, kinesthetic sense, posture, motor skill, proprioception  to assist with  scapular, scapulothoracic and UE control with self care, reaching, carrying, lifting, house/yardwork, driving/computer work.     Therapeutic Activities & NMR:    [x] (43905 or 28339) Provided verbal/tactile cueing for activities related to improving balance, coordination, kinesthetic sense, posture, motor skill, proprioception and motor activation to allow for proper function of scapular, scapulothoracic and UE control with self care, carrying, lifting, driving/computer work    Home Exercise Program:    [x] (35530) Reviewed/Progressed HEP activities related to strengthening, flexibility, endurance, ROM of scapular, scapulothoracic and UE control with self care, reaching, carrying, lifting, house/yardwork, driving/computer work  [] (14165) Reviewed/Progressed HEP activities related to improving balance, coordination, kinesthetic function, and ADLs as indicated by Functional Deficits. []? Progressing: [x]? Met: []? Not Met: []? Adjusted     Long Term Goals to be achieved in 6 weeks (through 1/15/21), including patient directed goals to address patient identified performance deficits:  1) Pt to be independent in graded HEP progression with a good level of effort and compliance. [x]? Progressing: []? Met: []? Not Met: []? Adjusted   2) Pt to report a score of </= 15 % on the Quick DASH disability questionnaire for increased performance with carrying, moving, and handling objects. []? Progressing: []? Met: []? Not Met: []? Adjusted   3) Pt will demonstrate increased middle finger ROM by 50 degrees for improved independence with gross grasp, work tasks, wheelchair pushing. []? Progressing: [x]? Met: []? Not Met: []? Adjusted   4) Pt will demonstrate increased strength to 50% of uninvolved hand for improved independence with heavier workplace lifting, household tasks. [x]? Progressing: []? Met: []? Not Met: []? Adjusted   5) Pt will have a decrease in pain to 4/10 to facilitate return to normal functional use patterns during day. []? Progressing: [x]? Met: []? Not Met: []? Adjusted      Overall Progression Towards Functional Goals/Treatment Progress Update:  [x] Patient is progressing as expected towards functional goals listed. [] Progression is slowed due to complexities/impairments listed. [] Progression has been slowed due to co-morbidities.   [] Plan just implemented, too soon to assess goals progression <30 days  [] Goals require adjustment due to lack of progress  [] Patient is not progressing as expected and requires additional follow up with physician  [] All goals are met  [] Other:     Prognosis for POC: [x] Good [] Fair  [] Poor    Patient requires continued skilled intervention: [x] Yes  [] No    Treatment/Activity Tolerance:  [x] Patient able to complete treatment  [] Patient limited by fatigue  [] Patient limited by pain    [] Patient limited by other medical complications  [] Other:                  PLAN: Continue therapy if ordered after 12/14 appt with doctor  [] Continue per plan of care [] Alter current plan (see comments above)  [] Plan of care initiated [x] Hold pending MD visit [] Discharge      Electronically signed by:  Fernie Patel OTR/L, [address] [place] [address]      Note: If patient does not return for scheduled/ recommended follow up visits, this note will serve as a discharge from care along with most recent update on progress.

## 2020-12-14 ENCOUNTER — OFFICE VISIT (OUTPATIENT)
Dept: ORTHOPEDIC SURGERY | Age: 30
End: 2020-12-14
Payer: COMMERCIAL

## 2020-12-14 VITALS — HEIGHT: 59 IN | WEIGHT: 164 LBS | BODY MASS INDEX: 33.06 KG/M2

## 2020-12-14 PROCEDURE — G8419 CALC BMI OUT NRM PARAM NOF/U: HCPCS | Performed by: ORTHOPAEDIC SURGERY

## 2020-12-14 PROCEDURE — 1036F TOBACCO NON-USER: CPT | Performed by: ORTHOPAEDIC SURGERY

## 2020-12-14 PROCEDURE — G8427 DOCREV CUR MEDS BY ELIG CLIN: HCPCS | Performed by: ORTHOPAEDIC SURGERY

## 2020-12-14 PROCEDURE — 99213 OFFICE O/P EST LOW 20 MIN: CPT | Performed by: ORTHOPAEDIC SURGERY

## 2020-12-14 PROCEDURE — G8484 FLU IMMUNIZE NO ADMIN: HCPCS | Performed by: ORTHOPAEDIC SURGERY

## 2020-12-14 NOTE — PROGRESS NOTES
Assessment: 55-year-old female presenting with history of left long finger injury with direct blow to the left long finger sustained 11/20/2020  1. Nondisplaced left long finger middle phalanx base intra-articular fracture with no evidence of joint subluxation or dislocation    Treatment Plan: Patient is now nearly 3 and half weeks status post injury. We did defer x-rays today secondary to her pregnancy and her examination today demonstrates what appears to be a stable finger with excellent range of motion and no new swelling. Overall she has demonstrated continued improvement. Because of the timing of her injury I would like her to avoid any heavier lifting gripping at least for several more weeks but now may limit to approximately 10 pounds of lifting and gripping with use of the staci tape. I would like her to continue at home with range of motion exercises for her hand. We will plan for follow-up in approximately 2 weeks and if she demonstrates continued progressive improvement we will plan to progress her to activity as tolerated likely. She is amenable and understanding of the plan today    No follow-ups on file. History of Present Illness  Tarsha Redd is a 27 y.o. 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  Date of injury: 11/20/2020  Mechanism of injury: The patient was in a residence room when she struck her left hand off of the top of the bed particularly her left long finger  She presented several days later to the emergency department where images obtained demonstrated fracture at the base of middle phalanx volar aspect nondisplaced involving approximately 25% of joint surface  The patient was placed in a splint at the time of her injury    Interval history: The patient reports that she has had improvement since her last visit, she notices improvement in pain and motion.   She does have some tenderness along the DIP joint more recently but certainly much improved overall since her last visit in terms of swelling as well as range of motion. She has been using staci tape for protection and avoiding any lifting or gripping with that hand recently  She is now approximately 3-1/2 weeks status post injury. She denies any other new complaints today    Review of Systems  Pertinent items are noted in HPI  Review of systems reviewed from Patient History Form dated on 11/30/2020  and available in the patient's chart under the Media tab. Vital Signs  There were no vitals filed for this visit. Physical Exam  Constitutional:  Patient is well-nourished and demonstrates normal hygiene. Mental Status:  Patient is alert and oriented to person, place and time. Skin:  Intact, no rashes or lesions.      Left long finger/hand examination  Inspection:  Minimal swelling of the left long finger  No evidence of clinical malrotation or angulation  No open wounds or skin breaks  No evidence of boutonniere or swan-neck deformity     Palpation:  Minimal tenderness to palpation near the PIP joint specifically volar aspect  No tenderness throughout the remainder of the finger or hand  Grossly stable finger with gentle stress radial and ulnar at PIP joint at full extension and 30 degrees of flexion  Range of Motion: Near full composite fist and full active extension of the finger with no boutonniere or swan-neck deformity, pulp to palm distance 0 cm with tip to distal palmar crease approximately 1 cm  Strength: 5 out of 5 FDS FDP EDC interossei     Special Tests: Negative Uriel's test left long finger  Gross sensation throughout fingertip radial and ulnar aspect of the fingertip with brisk capillary refill     Skin: There are no additional worrisome rashes, ulcerations or lesions.     Gait: normal nonantalgic gait     Circulation: well perfused with brisk capillary refill    Capillary refill brisk all fingers, symmetric  Gross sensation intact to light touch median/ulnar/radial nerves  Sensation intact to radial/ulnar aspect of fingertip        Radiology:    X-rays obtained and reviewed in office:  No new images obtained today    Additional Diagnostic Test Findings:    Office Procedures:  No orders of the defined types were placed in this encounter. Denise Hartmann MD  Orthopaedic Surgeon, Hand & Upper Extremity   [facility]    Contact Information:  Valorie Owen: [phone] Clinical )    This dictation was performed with a verbal recognition program [facility]) and it was checked for errors. It is possible that there are still dictated errors within this office note. If so, please bring any errors to my attention for an addendum. All efforts were made to ensure that this office note is accurate.

## 2020-12-28 ENCOUNTER — OFFICE VISIT (OUTPATIENT)
Dept: ORTHOPEDIC SURGERY | Age: 30
End: 2020-12-28
Payer: COMMERCIAL

## 2020-12-28 PROCEDURE — 99212 OFFICE O/P EST SF 10 MIN: CPT | Performed by: ORTHOPAEDIC SURGERY

## 2020-12-28 PROCEDURE — G8427 DOCREV CUR MEDS BY ELIG CLIN: HCPCS | Performed by: ORTHOPAEDIC SURGERY

## 2020-12-28 PROCEDURE — G8419 CALC BMI OUT NRM PARAM NOF/U: HCPCS | Performed by: ORTHOPAEDIC SURGERY

## 2020-12-28 PROCEDURE — G8484 FLU IMMUNIZE NO ADMIN: HCPCS | Performed by: ORTHOPAEDIC SURGERY

## 2020-12-28 PROCEDURE — 1036F TOBACCO NON-USER: CPT | Performed by: ORTHOPAEDIC SURGERY

## 2020-12-28 NOTE — PROGRESS NOTES
Assessment: 20-year-old female presenting with history of left long finger injury with direct blow to the left long finger sustained 11/20/2020  1.  Nondisplaced left long finger middle phalanx base intra-articular fracture with no evidence of joint subluxation or dislocation    Treatment Plan: I spoke with Ms. Zander Dial today regarding her progress. She appears to be healing appropriately with regards to her finger injury. We will continue to progress her activity as tolerated at this time. I discussed continue to focus on that terminal flexion with composite fist and edema control strategies with use of the buddy tape as needed with certain activities. She feels comfortable with that plan and plan to follow-up as symptoms dictate particularly if symptoms recur or she has any new swelling pain or any other limitations      No follow-ups on file. History of Present Illness  KATHLEEN Diallo is a 27 y.o. 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  Date of injury: 11/20/2020  Mechanism of injury: The patient was in a residence room when she struck her left hand off of the top of the bed particularly her left long finger  She presented several days later to the emergency department where images obtained demonstrated fracture at the base of middle phalanx volar aspect nondisplaced involving approximately 25% of joint surface  The patient was placed in a splint at the time of her injury    Interval history: The patient is now approximately 5 and half weeks status post injury  She has had progressive improvement in her motion and swelling since her last visit. No new injury and no pain reported today. She has no pain and has been using her hand and finger for routine activities around the house including doing some cooking and light daily activities.   She denies any new swelling and no new injury reported      Review of Systems  Pertinent

## 2022-01-08 ENCOUNTER — HOSPITAL ENCOUNTER (EMERGENCY)
Age: 32
Discharge: HOME | End: 2022-01-08
Attending: EMERGENCY MEDICINE
Payer: COMMERCIAL

## 2022-01-08 VITALS
HEART RATE: 94 BPM | HEIGHT: 58 IN | BODY MASS INDEX: 36.75 KG/M2 | DIASTOLIC BLOOD PRESSURE: 70 MMHG | TEMPERATURE: 100.3 F | OXYGEN SATURATION: 99 % | RESPIRATION RATE: 14 BRPM | WEIGHT: 175.1 LBS | SYSTOLIC BLOOD PRESSURE: 134 MMHG

## 2022-01-08 DIAGNOSIS — L02.91 ABSCESS: Primary | ICD-10-CM

## 2022-01-08 PROCEDURE — 10060 I&D ABSCESS SIMPLE/SINGLE: CPT

## 2022-01-08 PROCEDURE — 99284 EMERGENCY DEPT VISIT MOD MDM: CPT

## 2022-01-08 PROCEDURE — 6370000000 HC RX 637 (ALT 250 FOR IP): Performed by: EMERGENCY MEDICINE

## 2022-01-08 RX ORDER — CLINDAMYCIN HYDROCHLORIDE 300 MG/1
300 CAPSULE ORAL 3 TIMES DAILY
Qty: 21 CAPSULE | Refills: 0 | Status: SHIPPED | OUTPATIENT
Start: 2022-01-08 | End: 2022-01-15

## 2022-01-08 RX ORDER — IBUPROFEN 800 MG/1
800 TABLET ORAL ONCE
Status: COMPLETED | OUTPATIENT
Start: 2022-01-08 | End: 2022-01-08

## 2022-01-08 RX ORDER — CLINDAMYCIN HYDROCHLORIDE 300 MG/1
300 CAPSULE ORAL ONCE
Status: COMPLETED | OUTPATIENT
Start: 2022-01-08 | End: 2022-01-08

## 2022-01-08 RX ORDER — IBUPROFEN 600 MG/1
600 TABLET ORAL EVERY 8 HOURS PRN
Qty: 20 TABLET | Refills: 0 | Status: SHIPPED | OUTPATIENT
Start: 2022-01-08 | End: 2022-06-09 | Stop reason: SDUPTHER

## 2022-01-08 RX ADMIN — IBUPROFEN 800 MG: 800 TABLET, FILM COATED ORAL at 20:58

## 2022-01-08 RX ADMIN — CLINDAMYCIN HYDROCHLORIDE 300 MG: 300 CAPSULE ORAL at 20:58

## 2022-01-08 ASSESSMENT — PAIN SCALES - GENERAL: PAINLEVEL_OUTOF10: 10

## 2022-01-08 ASSESSMENT — PAIN DESCRIPTION - DESCRIPTORS: DESCRIPTORS: CONSTANT

## 2022-01-08 ASSESSMENT — PAIN DESCRIPTION - PAIN TYPE: TYPE: ACUTE PAIN

## 2022-01-08 ASSESSMENT — PAIN DESCRIPTION - ORIENTATION: ORIENTATION: LEFT

## 2022-01-09 NOTE — ED PROVIDER NOTES
[facility] Emergency Department      Pt Name: Rodger Padron  MRN: [id number]  [place] 1990  Date of evaluation: 2022  Provider: Connie Aguayo MD  CHIEF COMPLAINT  Chief Complaint   Patient presents with    Abscess     \"I feel realy bad\" under left armpit      HPI  Rodger Padron is a 32 y.o. female who presents because of an area of infection under her left arm. She has had it for a week. Is very painful to move her left arm. She has had this problem in the past but has been a long time. She felt like she might have a fever today. Denies any nausea or vomiting. REVIEW OF SYSTEMS:  No vomiting, no cough, no numbness Pertinent positives and negatives as per the HPI. All other review of systems reviewed and negative. Nursing notes reviewed. PAST MEDICAL HISTORY  Past Medical History:   Diagnosis Date    Influenza B 2020    Neisseria gonorrheae 2018     SURGICAL HISTORY  Past Surgical History:   Procedure Laterality Date     SECTION       SECTION       MEDICATIONS:  No current facility-administered medications on file prior to encounter. Current Outpatient Medications on File Prior to Encounter   Medication Sig Dispense Refill    pantoprazole (PROTONIX) 20 MG tablet Take 2 tablets by mouth daily 30 tablet 0    NONFORMULARY bcp       ALLERGIES  Patient has no known allergies. SOCIAL HISTORY:  Social History     Tobacco Use    Smoking status: Never Smoker    Smokeless tobacco: Never Used   Substance Use Topics    Alcohol use: No    Drug use: No     IMMUNIZATIONS:    There is no immunization history on file for this patient.     PHYSICAL EXAM  VITAL SIGNS:  BP (!) 142/90   Pulse 101   Temp 100.3 °F (37.9 °C) (Oral)   Resp 14   Ht 4' 10\" (1.473 m)   Wt 175 lb 1.6 oz (79.4 kg)   LMP 2022   SpO2 99%   Breastfeeding Unknown   BMI 36.60 kg/m²   Constitutional:  Alert 32 y.o. female who does not appear toxic   HENT:  Atraumatic, mucous membranes moist  Neck:  Supple, no signs of injury  Thorax & Lungs:  Respiratory effort normal  Abdomen:  Non distended  Back:  No deformity  Painful Area: Left axilla with an area of induration and sensitivity to the touch, no erythema, localized soft tissue swelling, distally neurovascularly intact Extremities:  No cyanosis, no edema of other areas  Neurologic:  Alert, speech clear, no focal deficits  Psychiatric:  Affect appropriate    DIAGNOSTIC RESULTS:  None    ED COURSE:    Medications administered:  Medications   ibuprofen (ADVIL;MOTRIN) tablet 800 mg (800 mg Oral Given 1/8/22 2058)   clindamycin (CLEOCIN) capsule 300 mg (300 mg Oral Given 1/8/22 2058)     PROCEDURES:  I & D:  We discussed the risks and the benefits and the patient gave permission for me to proceed. The patient's wound was prepped and cleansed. I anesthetized the skin with 2 ml of anesthetic w/o epinephrine. I made an incision with a number 11 scalpel at the central area of the abscess. I probed the area for loculations, expressed a large amount of purulent material.  I did not place any packing due to the nature of the wound and the fact I felt it would adequately drain without it. The patient tolerated the procedure well. CONSULTATIONS:  None    MEDICAL DECISION MAKING: Ruslan Thompson is a 32 y.o. female who presented because of painful axilla. The patient has an abscess which was drained. We advised the patient to return if she has increased swelling or redness despite medication, fever, or other concerns. Clinical findings are negative for more serious concerns such as severe cellulitis, sepsis, necrotizing fasciitis. Ruslan Thompson was given appropriate discharge instructions. Referral to follow up provider.    Discharge Medication List as of 1/8/2022  9:00 PM      START taking these medications    Details   clindamycin (CLEOCIN) 300 MG capsule Take 1 capsule by mouth 3 times daily for 7 days, Disp-21 capsule, R-0Print      ibuprofen (IBU) 600 MG tablet Take 1 tablet by mouth every 8 hours as needed for Pain, Disp-20 tablet, R-0Print           FOLLOW UP:    Grant Watson MD  [address] [place] [address]  [phone]    Schedule an appointment as soon as possible for a visit   For wound re-check    [facility] Emergency Department  [facility] [address]  [phone]  Go to   If symptoms worsen    FINAL IMPRESSION:    1. Abscess        (Please note that I used voice recognition software to generate this note.   Occasionally words are mistranscribed despite my efforts to edit errors.)       Galilea Stuart MD  01/09/22 5669

## 2022-06-09 ENCOUNTER — HOSPITAL ENCOUNTER (EMERGENCY)
Age: 32
Discharge: HOME | End: 2022-06-09
Attending: EMERGENCY MEDICINE
Payer: COMMERCIAL

## 2022-06-09 VITALS
RESPIRATION RATE: 16 BRPM | WEIGHT: 162.7 LBS | HEIGHT: 58 IN | HEART RATE: 98 BPM | SYSTOLIC BLOOD PRESSURE: 149 MMHG | TEMPERATURE: 98.8 F | BODY MASS INDEX: 34.15 KG/M2 | DIASTOLIC BLOOD PRESSURE: 81 MMHG | OXYGEN SATURATION: 100 %

## 2022-06-09 DIAGNOSIS — J06.9 ACUTE UPPER RESPIRATORY INFECTION: Primary | ICD-10-CM

## 2022-06-09 LAB — S PYO AG THROAT QL: NEGATIVE

## 2022-06-09 PROCEDURE — 99283 EMERGENCY DEPT VISIT LOW MDM: CPT

## 2022-06-09 PROCEDURE — 87081 CULTURE SCREEN ONLY: CPT

## 2022-06-09 PROCEDURE — 87880 STREP A ASSAY W/OPTIC: CPT

## 2022-06-09 RX ORDER — IBUPROFEN 600 MG/1
600 TABLET ORAL EVERY 8 HOURS PRN
Qty: 21 TABLET | Refills: 0 | Status: SHIPPED | OUTPATIENT
Start: 2022-06-09 | End: 2022-06-16

## 2022-06-09 RX ORDER — BENZOCAINE/MENTHOL 6 MG-10 MG
1 LOZENGE MUCOUS MEMBRANE EVERY 6 HOURS PRN
Qty: 20 LOZENGE | Refills: 0 | Status: SHIPPED | OUTPATIENT
Start: 2022-06-09 | End: 2022-06-14

## 2022-06-09 ASSESSMENT — ENCOUNTER SYMPTOMS
TROUBLE SWALLOWING: 0
NAUSEA: 0
COUGH: 0
WHEEZING: 0
SHORTNESS OF BREATH: 0
VOMITING: 0
ABDOMINAL PAIN: 0
EYE DISCHARGE: 0
SORE THROAT: 1

## 2022-06-10 NOTE — ED PROVIDER NOTES
Emergency Department Provider Note  Location: [address] [place] [address]  2022     Patient Identification  Shadi Mike is a 32 y.o. female    Chief Complaint  Pharyngitis      Mode of Arrival  private car    HPI  (History provided by patient)  This is a 32 y.o. female presented today for sore throat. Patient says she first developed nasal drainage 3 days ago. No fever. Then yesterday, she started having sore throat. No cough. She denies body ache, earache, diarrhea, nausea or vomiting. No rash. Denies sick contact. ROS  Review of Systems   Constitutional: Negative for chills and fever. HENT: Positive for congestion, postnasal drip and sore throat. Negative for trouble swallowing. Eyes: Negative for discharge. Respiratory: Negative for cough, shortness of breath and wheezing. Cardiovascular: Negative for chest pain. Gastrointestinal: Negative for abdominal pain, nausea and vomiting. Musculoskeletal: Negative for myalgias and neck stiffness. Skin: Negative for rash. Neurological: Negative for headaches. I have reviewed the following nursing documentation:  Allergies: No Known Allergies    Past medical history:  has a past medical history of Influenza B (2020) and Neisseria gonorrheae (2018). Past surgical history:  has a past surgical history that includes  section and  section. Home medications: none reported    Social history:  reports that she has never smoked. She has never used smokeless tobacco. She reports that she does not drink alcohol and does not use drugs. Family history:  History reviewed. No pertinent family history. Exam  ED Triage Vitals [22 2117]   BP Temp Temp Source Heart Rate Resp SpO2 Height Weight   (!) 149/81 98.8 °F (37.1 °C) Oral 98 16 100 % 4' 10\" (1.473 m) 162 lb 11.2 oz (73.8 kg)   Nursing note and vital signs reviewed  Constitutional: Patient is oriented to person, place, and time. WDWN.  No distress. Nontoxic. HENT:      Head: Normocephalic and atraumatic. Ears: External ears normal. TM normal on the left. Right TM obscured by cerumen. Nose: Nose normal.     Mouth: Membrane mucosa moist and pink. Uvula midline. Soft palate symmetrical.  No evidence of PTA. No tonsillar exudate. No trismus. No submandibular fullness or tongue elevation. Eyes: Anicteric sclera. PERRL. EOMI. No discharge. Neck: Supple. Trachea midline. Good ROM. No meningismus signs. No mass. Lymph: No cervical adenopathy  Cardiovascular: RRR, no g/r/m. 2+ radial pulses. Pulmonary/Chest: Effort normal. No respiratory distress. CTAB. No stridor. No wheezes. No rales. Musculoskeletal: No extremity edema. Compartments soft. No deformity. Neurological: Alert and oriented to person, place, and time. No facial droop. No slurred speech. Skin: Warm and dry. No rash. No petechiae. MDM/ED Course     Labs  Results for orders placed or performed during the hospital encounter of 06/09/22   Strep Screen Group A Throat    Specimen: Throat   Result Value Ref Range    Rapid Strep A Screen Negative Negative       Strep culture sent. - Patient seen and evaluated in room 4.  32 y.o. female presented for sore throat that started yesterday and nasal congestion/postnasal drip that started 3 days ago. Overall patient is well-appearing without signs of systemic toxicity. She is afebrile, not tachycardic, not hypoxic. Exam did not find acute abnormality. Rapid strep negative. I discussed the result with the patient. We agreed to treat symptomatically as acute URI while waiting on strep culture. Since suspicion for strep throat is low, I would not initiate empiric antibiotic.  - Return precautions also discussed. patient verbalized understanding of care plan and agreed to follow-up with PCP as advised.       I estimate there is LOW risk for ACUTE RESPIRATORY FAILURE, EPIGLOTTITIS, MENINGITIS, OR ABSCESS IN THE HEAD/NECK REGION (e.g. PTA, RTP, CHERYL'S, etc.), thus I consider the discharge disposition reasonable. Also, there is no evidence or peritonitis, sepsis, or toxicity. Cory Webb and I have discussed the diagnosis and risks, and we agree with discharging home to follow-up with PCP. We also discussed returning to the Emergency Department immediately if new or worsening symptoms occur. We have discussed the symptoms which are most concerning (e.g., changing or worsening pain, trouble swallowing or breathing, neck stiffness, fever) that necessitate immediate return. Clinical Impression:  1. Acute upper respiratory infection          Disposition:  Discharge to home in good condition. Blood pressure (!) 149/81, pulse 98, temperature 98.8 °F (37.1 °C), temperature source Oral, resp. rate 16, height 4' 10\" (1.473 m), weight 162 lb 11.2 oz (73.8 kg), last menstrual period 05/16/2022, SpO2 100 %, not currently breastfeeding. Patient was given scripts for the following medications. I counseled patient how to take these medications. Discharge Medication List as of 6/9/2022 10:15 PM      START taking these medications    Details   Benzocaine-Menthol (CHLORASEPTIC) 6-10 MG LOZG lozenge Take 1 lozenge by mouth every 6 hours as needed for Sore Throat or Pain, Disp-20 lozenge, R-0Normal             Disposition referral (if applicable):  Heather Welsh MD  [address] [place] [address]  [phone]    Schedule an appointment as soon as possible for a visit in 4 days      [address] Emergency Department  [address] [place] [address] [place]  [phone]    As needed, If symptoms worsen       This chart was generated in part by using Dragon Dictation system and may contain errors related to that system including errors in grammar, punctuation, and spelling, as well as words and phrases that may be inappropriate.  If there are any questions or concerns please feel free to contact the dictating provider for clarification.      Jearline Fleischer, MD  [facility] Isaias Stewart MD  06/09/22 6808

## 2022-06-12 LAB — S PYO THROAT QL CULT: NORMAL

## 2024-01-29 ENCOUNTER — HOSPITAL ENCOUNTER (EMERGENCY)
Age: 34
Discharge: HOME | End: 2024-01-29
Attending: EMERGENCY MEDICINE
Payer: COMMERCIAL

## 2024-01-29 VITALS
RESPIRATION RATE: 14 BRPM | TEMPERATURE: 98.3 F | WEIGHT: 170.2 LBS | HEIGHT: 58 IN | OXYGEN SATURATION: 100 % | HEART RATE: 84 BPM | SYSTOLIC BLOOD PRESSURE: 133 MMHG | DIASTOLIC BLOOD PRESSURE: 66 MMHG | BODY MASS INDEX: 35.73 KG/M2

## 2024-01-29 DIAGNOSIS — J02.9 SORE THROAT: Primary | ICD-10-CM

## 2024-01-29 LAB — S PYO AG THROAT QL: NEGATIVE

## 2024-01-29 PROCEDURE — 99283 EMERGENCY DEPT VISIT LOW MDM: CPT

## 2024-01-29 PROCEDURE — 87880 STREP A ASSAY W/OPTIC: CPT

## 2024-01-29 PROCEDURE — 6360000002 HC RX W HCPCS: Performed by: EMERGENCY MEDICINE

## 2024-01-29 PROCEDURE — 87081 CULTURE SCREEN ONLY: CPT

## 2024-01-29 PROCEDURE — 6370000000 HC RX 637 (ALT 250 FOR IP): Performed by: EMERGENCY MEDICINE

## 2024-01-29 RX ORDER — ACETAMINOPHEN 325 MG/1
650 TABLET ORAL ONCE
Status: COMPLETED | OUTPATIENT
Start: 2024-01-29 | End: 2024-01-29

## 2024-01-29 RX ORDER — IBUPROFEN 800 MG/1
800 TABLET ORAL ONCE
Status: COMPLETED | OUTPATIENT
Start: 2024-01-29 | End: 2024-01-29

## 2024-01-29 RX ORDER — DEXAMETHASONE 4 MG/1
8 TABLET ORAL ONCE
Status: COMPLETED | OUTPATIENT
Start: 2024-01-29 | End: 2024-01-29

## 2024-01-29 RX ADMIN — IBUPROFEN 800 MG: 800 TABLET, FILM COATED ORAL at 10:48

## 2024-01-29 RX ADMIN — ACETAMINOPHEN 650 MG: 325 TABLET ORAL at 10:48

## 2024-01-29 RX ADMIN — DEXAMETHASONE 8 MG: 4 TABLET ORAL at 10:48

## 2024-01-29 ASSESSMENT — PAIN SCALES - GENERAL: PAINLEVEL_OUTOF10: 8

## 2024-01-29 ASSESSMENT — PAIN DESCRIPTION - DESCRIPTORS: DESCRIPTORS: ACHING

## 2024-01-29 ASSESSMENT — PAIN DESCRIPTION - LOCATION: LOCATION: THROAT

## 2024-01-29 ASSESSMENT — PAIN - FUNCTIONAL ASSESSMENT: PAIN_FUNCTIONAL_ASSESSMENT: 0-10

## 2024-01-29 NOTE — ED PROVIDER NOTES
management      I estimate there is low risk for sepsis, MI, stroke, tamponade, PTX, toxicity, or other life threatening etiology. Given the best available information and clinical assessment I estimate the risk of hospitalization to be greater than risk of treatment at home. We discussed and I explained the risk could rapidly change and return precautions and instructions given. Discharge disposition is reasonable.     I am the Primary Clinician of Record.  FINAL IMPRESSION      1. Sore throat          DISPOSITION/PLAN   DISPOSITION Decision To Discharge 01/29/2024 11:07:02 AM      PATIENT REFERRED TO:  Zoë Hood MD  [address] [place] [address]- [address] Floor  [facility] [address] [phone]    Schedule an appointment as soon as possible for a visit   For follow up appointment, As needed      DISCHARGE MEDICATIONS:  New Prescriptions    No medications on file          (Please note that portions of this note were completed with a voice recognition program.  Efforts were made to edit the dictations but occasionally words are mis-transcribed.)    Yas Denton MD (electronically signed)  Attending Emergency Physician     Yas Denton MD  01/29/24 9070

## 2024-01-29 NOTE — DISCHARGE INSTRUCTIONS
Your rapid strep test today was negative.  There is a culture pending.  In a couple of days and the results come back we will call you if it is positive.  You can also follow-up the results on MyChart.      In the meantime continue to take over the counter medications like NSAIDs, tylenol, sudafed for symptoms of body aches, congestion, fevers.  We did give you a dose of Decadron here which is a strong anti-inflammatory steroid that will last in your system for 3 days.    Follow up with primary care as needed. Return to ED for any new or worsening symptoms or concerns.

## 2024-01-31 LAB — S PYO THROAT QL CULT: NORMAL

## 2024-08-26 ENCOUNTER — APPOINTMENT (OUTPATIENT)
Dept: GENERAL RADIOLOGY | Age: 34
End: 2024-08-26
Payer: COMMERCIAL

## 2024-08-26 ENCOUNTER — HOSPITAL ENCOUNTER (EMERGENCY)
Age: 34
Discharge: HOME | End: 2024-08-26
Attending: STUDENT IN AN ORGANIZED HEALTH CARE EDUCATION/TRAINING PROGRAM
Payer: COMMERCIAL

## 2024-08-26 VITALS
WEIGHT: 166.01 LBS | HEART RATE: 77 BPM | RESPIRATION RATE: 14 BRPM | OXYGEN SATURATION: 99 % | DIASTOLIC BLOOD PRESSURE: 53 MMHG | SYSTOLIC BLOOD PRESSURE: 124 MMHG | TEMPERATURE: 98.6 F | HEIGHT: 58 IN | BODY MASS INDEX: 34.85 KG/M2

## 2024-08-26 DIAGNOSIS — S62.619A CLOSED AVULSION FRACTURE OF PROXIMAL PHALANX OF FINGER, INITIAL ENCOUNTER: Primary | ICD-10-CM

## 2024-08-26 PROCEDURE — 73130 X-RAY EXAM OF HAND: CPT

## 2024-08-26 PROCEDURE — 99283 EMERGENCY DEPT VISIT LOW MDM: CPT

## 2024-08-26 PROCEDURE — 6370000000 HC RX 637 (ALT 250 FOR IP): Performed by: STUDENT IN AN ORGANIZED HEALTH CARE EDUCATION/TRAINING PROGRAM

## 2024-08-26 PROCEDURE — 73140 X-RAY EXAM OF FINGER(S): CPT

## 2024-08-26 RX ORDER — ACETAMINOPHEN 325 MG/1
650 TABLET ORAL ONCE
Status: COMPLETED | OUTPATIENT
Start: 2024-08-26 | End: 2024-08-26

## 2024-08-26 RX ADMIN — ACETAMINOPHEN 650 MG: 325 TABLET ORAL at 09:40

## 2024-08-26 ASSESSMENT — PAIN - FUNCTIONAL ASSESSMENT
PAIN_FUNCTIONAL_ASSESSMENT: NONE - DENIES PAIN
PAIN_FUNCTIONAL_ASSESSMENT: 0-10

## 2024-08-26 ASSESSMENT — PAIN DESCRIPTION - ORIENTATION: ORIENTATION: LEFT

## 2024-08-26 ASSESSMENT — PAIN DESCRIPTION - LOCATION: LOCATION: HAND

## 2024-08-26 ASSESSMENT — PAIN DESCRIPTION - FREQUENCY: FREQUENCY: CONTINUOUS

## 2024-08-26 ASSESSMENT — PAIN SCALES - GENERAL: PAINLEVEL_OUTOF10: 8

## 2024-08-26 ASSESSMENT — PAIN DESCRIPTION - PAIN TYPE: TYPE: ACUTE PAIN

## 2024-08-26 ASSESSMENT — PAIN DESCRIPTION - DESCRIPTORS: DESCRIPTORS: ACHING

## 2024-08-26 NOTE — ED PROVIDER NOTES
[facility] EMERGENCY DEPARTMENT      EMERGENCY MEDICINE     Pt Name: Aydee Diallo  MRN: [id number]  Birthdate 1990  Date of evaluation: 2024  Provider: Vincent Gutierrez DO    CHIEF COMPLAINT       Chief Complaint   Patient presents with    Hand Injury     left     HISTORY OF PRESENT ILLNESS   Aydee Diallo is a 34 y.o. female who presents to the emergency department for swelling of her left third digit after slipping on a Lego at her home.  She states this happened a couple of days ago but has not presented to the hospital since then for evaluation.  She states that her third digit is swollen.  She is able to move it at the base but cannot move any of the intermediate joints.  She has sensation.  States she has jammed her fingers before and feels like this is similar.  No medications prior to arrival.  No fevers.  No other complaints at this time.        PASTMEDICAL HISTORY     Past Medical History:   Diagnosis Date    Influenza B 2020    Neisseria gonorrheae 2018       There is no problem list on file for this patient.    SURGICAL HISTORY       Past Surgical History:   Procedure Laterality Date     SECTION       SECTION         CURRENT MEDICATIONS       Discharge Medication List as of 2024 10:27 AM        CONTINUE these medications which have NOT CHANGED    Details   ibuprofen (IBU) 600 MG tablet Take 1 tablet by mouth every 8 hours as needed for Pain or Fever, Disp-21 tablet, R-0Normal      pantoprazole (PROTONIX) 20 MG tablet Take 2 tablets by mouth daily, Disp-30 tablet, R-0Print             ALLERGIES     has No Known Allergies.    FAMILY HISTORY     has no family status information on file.        SOCIAL HISTORY       Social History     Tobacco Use    Smoking status: Never    Smokeless tobacco: Never   Vaping Use    Vaping status: Never Used   Substance Use Topics    Alcohol use: No    Drug use: No       PHYSICAL EXAM       ED Triage Vitals [24 0832]  prior to d/c regarding ortho follow up.        Vitals Reviewed:    Vitals:    08/26/24 0832   BP: (!) 124/53   Pulse: 77   Resp: 14   Temp: 98.6 °F (37 °C)   SpO2: 99%   Weight: 75.3 kg (166 lb 0.1 oz)   Height: 1.473 m (4' 10\")       The patient was seen and examined.The results of pertinent diagnostic studies and exam findings were discussed. The patient’s provisional diagnosis and plan of care were discussed with the patient who expressed a complete understanding. Any medications were reviewed and indications and risks of medications were discussed with the patient.    Strict verbal and written return precautions, instructions and appropriate follow-up were provided as well.     ED Medications administered this visit:  (None if blank)  Medications   acetaminophen (TYLENOL) tablet 650 mg (650 mg Oral Given 8/26/24 0940)         Responses to treatment:       PROCEDURES: (None if blank)  Splint Application    Date/Time: 8/26/2024 11:09 AM    Performed by: Vincent Gutierrez DO  Authorized by: Vincent Gutierrez DO    Consent:     Consent obtained:  Verbal    Consent given by:  Patient    Risks, benefits, and alternatives were discussed: yes      Risks discussed:  Discoloration, numbness, pain and swelling    Alternatives discussed:  No treatment  Universal protocol:     Patient identity confirmed:  Verbally with patient  Pre-procedure details:     Distal neurologic exam:  Normal    Distal perfusion: distal pulses strong and brisk capillary refill    Procedure details:     Location:  Finger    Finger location:  L long finger    Strapping: yes      Cast type:  Finger    Splint type:  Finger    Supplies:  Aluminum splint    Attestation: Splint applied and adjusted personally by me    Post-procedure details:     Distal neurologic exam:  Normal    Distal perfusion: distal pulses strong      Procedure completion:  Tolerated well, no immediate complications  :       CRITICAL CARE: (None if blank)  I personally saw the patient

## 2024-08-26 NOTE — DISCHARGE INSTRUCTIONS
You were seen today in the emergency department for evaluation of a finger injury.  Your finger has a small fracture and it, and a splint has been placed on it as well as a follow-up with orthopedic surgery on Thursday.  Please call the number listed above to verify your time for that appointment.  It is very important that you keep your finger in the splint device that was placed in the emergency department.  You do not want to straighten your finger all the way out to promote healing and no further injury.  May use Tylenol and Motrin at home for symptoms of pain, do not take more than indicated on the package.  Please return the emergency department if you experience any further concerning symptoms.

## 2024-08-26 NOTE — ED NOTES
Patient to ed with complaints of a left hand injury after a trip and fall Saturday, patient reports increased pain and swelling.

## 2024-08-26 NOTE — ED NOTES
Patient discharge instructions verbal and written, patient verbalized understanding.  Alert/oriented X4, Clear speech.  Patient exhibits no distress, ambulates with steady gait per self leaving unit, no further request.

## 2024-08-26 NOTE — DISCHARGE INSTR - COC
Continuity of Care Form    Patient Name: Aydee Diallo   :  1990  MRN:  [id number]    Admit date:  2024  Discharge date:  ***    Code Status Order: No Order   Advance Directives:   Advance Care Flowsheet Documentation             Admitting Physician:  No admitting provider for patient encounter.  PCP: Zoë Hood MD    Discharging Nurse: ***  Discharging Hospital Unit/Room#: RW-KAILEE/NONE  Discharging Unit Phone Number: ***    Emergency Contact:   Extended Emergency Contact Information  Primary Emergency Contact: [place] [address] [facility]  Home Phone: [phone]  Mobile Phone: [phone]  Relation: Parent  Secondary Emergency Contact: Allison Murray  Home Phone: [phone]  Work Phone: [phone]  Relation: Other    Past Surgical History:  Past Surgical History:   Procedure Laterality Date     SECTION       SECTION         Immunization History:     There is no immunization history on file for this patient.    Active Problems:  There is no problem list on file for this patient.      Isolation/Infection:   Isolation            No Isolation          Patient Infection Status       Infection Onset Added Last Indicated Last Indicated By Review Planned Expiration Resolved Resolved By    None active    Resolved    Influenza 20 Rapid influenza A/B antigens   20 Infection             Nurse Assessment:  Last Vital Signs: BP (!) 124/53   Pulse 77   Temp 98.6 °F (37 °C)   Resp 14   Ht 1.473 m (4' 10\")   Wt 75.3 kg (166 lb 0.1 oz)   SpO2 99%   BMI 34.70 kg/m²     Last documented pain score (0-10 scale): Pain Level: 8  Last Weight:   Wt Readings from Last 1 Encounters:   24 75.3 kg (166 lb 0.1 oz)     Mental Status:  {IP PT MENTAL STATUS:01175}    IV Access:  { LORIE IV ACCESS:084258714}    Nursing Mobility/ADLs:  Walking   {P DME ADLs:724534203}  Transfer  {P DME ADLs:370695725}  Bathing  {P

## 2024-11-28 ENCOUNTER — APPOINTMENT (OUTPATIENT)
Dept: CT IMAGING | Age: 34
End: 2024-11-28
Payer: COMMERCIAL

## 2024-11-28 ENCOUNTER — HOSPITAL ENCOUNTER (EMERGENCY)
Age: 34
Discharge: HOME | End: 2024-11-28
Attending: EMERGENCY MEDICINE
Payer: COMMERCIAL

## 2024-11-28 VITALS
HEIGHT: 58 IN | WEIGHT: 169.3 LBS | TEMPERATURE: 99.8 F | RESPIRATION RATE: 20 BRPM | BODY MASS INDEX: 35.54 KG/M2 | HEART RATE: 99 BPM | OXYGEN SATURATION: 100 % | DIASTOLIC BLOOD PRESSURE: 70 MMHG | SYSTOLIC BLOOD PRESSURE: 132 MMHG

## 2024-11-28 DIAGNOSIS — K52.9 GASTROENTERITIS: Primary | ICD-10-CM

## 2024-11-28 LAB
ALBUMIN SERPL-MCNC: 4.6 G/DL (ref 3.4–5)
ALBUMIN/GLOB SERPL: 1.4 {RATIO} (ref 1.1–2.2)
ALP SERPL-CCNC: 59 U/L (ref 40–129)
ALT SERPL-CCNC: 9 U/L (ref 10–40)
ANION GAP SERPL CALCULATED.3IONS-SCNC: 10 MMOL/L (ref 3–16)
AST SERPL-CCNC: 17 U/L (ref 15–37)
BASOPHILS # BLD: 0.1 K/UL (ref 0–0.2)
BASOPHILS NFR BLD: 1.5 %
BILIRUB SERPL-MCNC: 1 MG/DL (ref 0–1)
BILIRUB UR QL STRIP.AUTO: NEGATIVE
BUN SERPL-MCNC: 12 MG/DL (ref 7–20)
CALCIUM SERPL-MCNC: 8.9 MG/DL (ref 8.3–10.6)
CHLORIDE SERPL-SCNC: 100 MMOL/L (ref 99–110)
CLARITY UR: CLEAR
CO2 SERPL-SCNC: 23 MMOL/L (ref 21–32)
COLOR UR: YELLOW
CREAT SERPL-MCNC: <0.5 MG/DL (ref 0.6–1.1)
DEPRECATED RDW RBC AUTO: 13.9 % (ref 12.4–15.4)
EOSINOPHIL # BLD: 0.1 K/UL (ref 0–0.6)
EOSINOPHIL NFR BLD: 2 %
GFR SERPLBLD CREATININE-BSD FMLA CKD-EPI: >90 ML/MIN/{1.73_M2}
GLUCOSE SERPL-MCNC: 103 MG/DL (ref 70–99)
GLUCOSE UR STRIP.AUTO-MCNC: NEGATIVE MG/DL
HCG UR QL: NEGATIVE
HCT VFR BLD AUTO: 41.8 % (ref 36–48)
HGB BLD-MCNC: 14 G/DL (ref 12–16)
HGB UR QL STRIP.AUTO: NEGATIVE
KETONES UR STRIP.AUTO-MCNC: NEGATIVE MG/DL
LEUKOCYTE ESTERASE UR QL STRIP.AUTO: NEGATIVE
LIPASE SERPL-CCNC: 18 U/L (ref 13–60)
LYMPHOCYTES # BLD: 0.4 K/UL (ref 1–5.1)
LYMPHOCYTES NFR BLD: 6.5 %
MCH RBC QN AUTO: 28.4 PG (ref 26–34)
MCHC RBC AUTO-ENTMCNC: 33.5 G/DL (ref 31–36)
MCV RBC AUTO: 84.9 FL (ref 80–100)
MONOCYTES # BLD: 0.2 K/UL (ref 0–1.3)
MONOCYTES NFR BLD: 2.5 %
NEUTROPHILS # BLD: 5.6 K/UL (ref 1.7–7.7)
NEUTROPHILS NFR BLD: 87.5 %
NITRITE UR QL STRIP.AUTO: NEGATIVE
PH UR STRIP.AUTO: 7 [PH] (ref 5–8)
PLATELET # BLD AUTO: 246 K/UL (ref 135–450)
PMV BLD AUTO: 9.5 FL (ref 5–10.5)
POTASSIUM SERPL-SCNC: 3.9 MMOL/L (ref 3.5–5.1)
PROT SERPL-MCNC: 7.8 G/DL (ref 6.4–8.2)
PROT UR STRIP.AUTO-MCNC: NEGATIVE MG/DL
RBC # BLD AUTO: 4.93 M/UL (ref 4–5.2)
SODIUM SERPL-SCNC: 133 MMOL/L (ref 136–145)
SP GR UR STRIP.AUTO: 1.02 (ref 1–1.03)
UA COMPLETE W REFLEX CULTURE PNL UR: NORMAL
UA DIPSTICK W REFLEX MICRO PNL UR: NORMAL
URN SPEC COLLECT METH UR: NORMAL
UROBILINOGEN UR STRIP-ACNC: 1 E.U./DL
WBC # BLD AUTO: 6.5 K/UL (ref 4–11)

## 2024-11-28 PROCEDURE — 80053 COMPREHEN METABOLIC PANEL: CPT

## 2024-11-28 PROCEDURE — 84703 CHORIONIC GONADOTROPIN ASSAY: CPT

## 2024-11-28 PROCEDURE — 85025 COMPLETE CBC W/AUTO DIFF WBC: CPT

## 2024-11-28 PROCEDURE — 6360000004 HC RX CONTRAST MEDICATION: Performed by: EMERGENCY MEDICINE

## 2024-11-28 PROCEDURE — 81003 URINALYSIS AUTO W/O SCOPE: CPT

## 2024-11-28 PROCEDURE — 83690 ASSAY OF LIPASE: CPT

## 2024-11-28 PROCEDURE — 96374 THER/PROPH/DIAG INJ IV PUSH: CPT

## 2024-11-28 PROCEDURE — 2500000003 HC RX 250 WO HCPCS: Performed by: EMERGENCY MEDICINE

## 2024-11-28 PROCEDURE — 96375 TX/PRO/DX INJ NEW DRUG ADDON: CPT

## 2024-11-28 PROCEDURE — 2580000003 HC RX 258: Performed by: EMERGENCY MEDICINE

## 2024-11-28 PROCEDURE — 99285 EMERGENCY DEPT VISIT HI MDM: CPT

## 2024-11-28 PROCEDURE — 6360000002 HC RX W HCPCS: Performed by: EMERGENCY MEDICINE

## 2024-11-28 PROCEDURE — 74177 CT ABD & PELVIS W/CONTRAST: CPT

## 2024-11-28 RX ORDER — ONDANSETRON 4 MG/1
4 TABLET, ORALLY DISINTEGRATING ORAL 3 TIMES DAILY PRN
Qty: 21 TABLET | Refills: 0 | Status: SHIPPED | OUTPATIENT
Start: 2024-11-28

## 2024-11-28 RX ORDER — IOPAMIDOL 755 MG/ML
75 INJECTION, SOLUTION INTRAVASCULAR
Status: COMPLETED | OUTPATIENT
Start: 2024-11-28 | End: 2024-11-28

## 2024-11-28 RX ORDER — 0.9 % SODIUM CHLORIDE 0.9 %
1000 INTRAVENOUS SOLUTION INTRAVENOUS ONCE
Status: COMPLETED | OUTPATIENT
Start: 2024-11-28 | End: 2024-11-28

## 2024-11-28 RX ORDER — ONDANSETRON 2 MG/ML
4 INJECTION INTRAMUSCULAR; INTRAVENOUS ONCE
Status: COMPLETED | OUTPATIENT
Start: 2024-11-28 | End: 2024-11-28

## 2024-11-28 RX ADMIN — IOPAMIDOL 75 ML: 755 INJECTION, SOLUTION INTRAVENOUS at 22:10

## 2024-11-28 RX ADMIN — SODIUM CHLORIDE 1000 ML: 9 INJECTION, SOLUTION INTRAVENOUS at 21:30

## 2024-11-28 RX ADMIN — FAMOTIDINE 20 MG: 10 INJECTION, SOLUTION INTRAVENOUS at 21:29

## 2024-11-28 RX ADMIN — ONDANSETRON 4 MG: 2 INJECTION INTRAMUSCULAR; INTRAVENOUS at 22:18

## 2024-11-29 NOTE — ED TRIAGE NOTES
35y/o female presents to the ED with mid abd pain onset 10 am today. Pt states after she ate breakfast she had pain, took pepto bismol with no relief. +nausea and diarrhea (x2 episodes). 6/10 pain.

## 2024-11-29 NOTE — ED PROVIDER NOTES
obviousabnormality, atraumatic.   Eyes:  conjunctiva/corneas clear, EOM's intact.  Sclera anicteric.   ENT: Mucous membranes moist.   Neck: Supple, symmetrical, trachea midline, no adenopathy.  No jugular venous distention.     Lungs:   Clear to auscultation bilaterally, respirationsunlabored.  No rales, rhonchi or wheezes.   Chest Wall:  No tenderness.    Heart:  Regular rate and rhythm, S1 and S2 normal, no murmur, rub or gallop.   Abdomen:   Soft, non-tender, bowel sounds active,   no masses, no organomegaly.   Extremities: No edema, cords or calf tenderness.  Full range of motion.   Pulses: 2+ and symmetric   Skin: Turgor is normal, no rashes or lesions.   Neurologic: Alert and oriented X 3.No focal findings.  Motor grossly normal.  Speech clear, no drift, CN III-XII grossly intact,        DIAGNOSTIC RESULTS   LABS:    Labs Reviewed   CBC WITH AUTO DIFFERENTIAL - Abnormal; Notable for the following components:       Result Value    Lymphocytes Absolute 0.4 (*)     All other components within normal limits   COMPREHENSIVE METABOLIC PANEL W/ REFLEX TO MG FOR LOW K - Abnormal; Notable for the following components:    Sodium 133 (*)     Glucose 103 (*)     Creatinine <0.5 (*)     ALT 9 (*)     All other components within normal limits   LIPASE   URINALYSIS WITH REFLEX TO CULTURE   PREGNANCY, URINE       All other labs were within normal range or not returned as of this dictation.    EKG: All EKG's are interpreted by the Emergency Department Physician who eithersigns or Co-signs this chart in the absence of a cardiologist.        RADIOLOGY:   Non-plain film images such as CT, Ultrasound and MRI are read by the radiologist. Plain radiographic images are visualized by myself.      *    Interpretation per the Radiologist below, if available at the time of this note:    CT ABDOMEN PELVIS W IV CONTRAST Additional Contrast? None   Final Result      1.  No acute abnormality in the abdomen and pelvis.         Electronically